# Patient Record
Sex: MALE | Race: WHITE | NOT HISPANIC OR LATINO | Employment: OTHER | URBAN - METROPOLITAN AREA
[De-identification: names, ages, dates, MRNs, and addresses within clinical notes are randomized per-mention and may not be internally consistent; named-entity substitution may affect disease eponyms.]

---

## 2021-06-23 ENCOUNTER — HOSPITAL ENCOUNTER (OUTPATIENT)
Dept: CARDIOLOGY | Facility: HOSPITAL | Age: 71
Discharge: HOME | End: 2021-06-23
Admitting: FAMILY MEDICINE

## 2021-06-23 ENCOUNTER — TRANSCRIBE ORDERS (OUTPATIENT)
Dept: ADMINISTRATIVE | Facility: HOSPITAL | Age: 71
End: 2021-06-23

## 2021-06-23 VITALS — HEIGHT: 73 IN | WEIGHT: 247 LBS | BODY MASS INDEX: 32.74 KG/M2

## 2021-06-23 DIAGNOSIS — I35.0 AORTIC VALVE STENOSIS, MODERATE: Primary | ICD-10-CM

## 2021-06-23 DIAGNOSIS — I35.0 AORTIC VALVE STENOSIS, MODERATE: ICD-10-CM

## 2021-06-23 PROCEDURE — 93306 TTE W/DOPPLER COMPLETE: CPT

## 2021-06-25 LAB
BH CV ECHO MEAS - AO MAX PG (FULL): 70.7 MMHG
BH CV ECHO MEAS - AO MAX PG: 78.1 MMHG
BH CV ECHO MEAS - AO MEAN PG (FULL): 42 MMHG
BH CV ECHO MEAS - AO MEAN PG: 46 MMHG
BH CV ECHO MEAS - AO ROOT AREA (BSA CORRECTED): 1.5
BH CV ECHO MEAS - AO ROOT AREA: 9.6 CM^2
BH CV ECHO MEAS - AO ROOT DIAM: 3.5 CM
BH CV ECHO MEAS - AO V2 MAX: 442 CM/SEC
BH CV ECHO MEAS - AO V2 MEAN: 313 CM/SEC
BH CV ECHO MEAS - AO V2 VTI: 85.8 CM
BH CV ECHO MEAS - ASC AORTA: 3.2 CM
BH CV ECHO MEAS - AVA(I,A): 1.1 CM^2
BH CV ECHO MEAS - AVA(I,D): 1.1 CM^2
BH CV ECHO MEAS - AVA(V,A): 0.97 CM^2
BH CV ECHO MEAS - AVA(V,D): 0.97 CM^2
BH CV ECHO MEAS - BSA(HAYCOCK): 2.4 M^2
BH CV ECHO MEAS - BSA: 2.4 M^2
BH CV ECHO MEAS - BZI_BMI: 32.6 KILOGRAMS/M^2
BH CV ECHO MEAS - BZI_METRIC_HEIGHT: 185.4 CM
BH CV ECHO MEAS - BZI_METRIC_WEIGHT: 112 KG
BH CV ECHO MEAS - EDV(CUBED): 119.8 ML
BH CV ECHO MEAS - EDV(MOD-SP2): 113 ML
BH CV ECHO MEAS - EDV(MOD-SP4): 133 ML
BH CV ECHO MEAS - EDV(TEICH): 114.4 ML
BH CV ECHO MEAS - EF(CUBED): 74.4 %
BH CV ECHO MEAS - EF(MOD-BP): 62 %
BH CV ECHO MEAS - EF(MOD-SP2): 62.8 %
BH CV ECHO MEAS - EF(MOD-SP4): 60.2 %
BH CV ECHO MEAS - EF(TEICH): 66.1 %
BH CV ECHO MEAS - ESV(CUBED): 30.7 ML
BH CV ECHO MEAS - ESV(MOD-SP2): 42 ML
BH CV ECHO MEAS - ESV(MOD-SP4): 53 ML
BH CV ECHO MEAS - ESV(TEICH): 38.8 ML
BH CV ECHO MEAS - FS: 36.5 %
BH CV ECHO MEAS - IVS/LVPW: 0.89
BH CV ECHO MEAS - IVSD: 1.1 CM
BH CV ECHO MEAS - LA DIMENSION: 3.6 CM
BH CV ECHO MEAS - LA/AO: 1
BH CV ECHO MEAS - LAD MAJOR: 6 CM
BH CV ECHO MEAS - LAT PEAK E' VEL: 4 CM/SEC
BH CV ECHO MEAS - LATERAL E/E' RATIO: 19.6
BH CV ECHO MEAS - LV DIASTOLIC VOL/BSA (35-75): 56.5 ML/M^2
BH CV ECHO MEAS - LV IVRT: 0.1 SEC
BH CV ECHO MEAS - LV MASS(C)D: 206.3 GRAMS
BH CV ECHO MEAS - LV MASS(C)DI: 87.6 GRAMS/M^2
BH CV ECHO MEAS - LV MAX PG: 7.4 MMHG
BH CV ECHO MEAS - LV MEAN PG: 4 MMHG
BH CV ECHO MEAS - LV SYSTOLIC VOL/BSA (12-30): 22.5 ML/M^2
BH CV ECHO MEAS - LV V1 MAX: 136.3 CM/SEC
BH CV ECHO MEAS - LV V1 MEAN: 93.3 CM/SEC
BH CV ECHO MEAS - LV V1 VTI: 29.2 CM
BH CV ECHO MEAS - LVIDD: 4.9 CM
BH CV ECHO MEAS - LVIDS: 3.1 CM
BH CV ECHO MEAS - LVLD AP2: 9.7 CM
BH CV ECHO MEAS - LVLD AP4: 9.4 CM
BH CV ECHO MEAS - LVLS AP2: 8.3 CM
BH CV ECHO MEAS - LVLS AP4: 8.4 CM
BH CV ECHO MEAS - LVOT AREA (M): 3.1 CM^2
BH CV ECHO MEAS - LVOT AREA: 3.1 CM^2
BH CV ECHO MEAS - LVOT DIAM: 2 CM
BH CV ECHO MEAS - LVPWD: 1.2 CM
BH CV ECHO MEAS - MED PEAK E' VEL: 4.5 CM/SEC
BH CV ECHO MEAS - MEDIAL E/E' RATIO: 17.3
BH CV ECHO MEAS - MV A MAX VEL: 90.8 CM/SEC
BH CV ECHO MEAS - MV DEC TIME: 0.31 SEC
BH CV ECHO MEAS - MV E MAX VEL: 77.5 CM/SEC
BH CV ECHO MEAS - MV E/A: 0.85
BH CV ECHO MEAS - PA ACC SLOPE: 966 CM/SEC^2
BH CV ECHO MEAS - PA ACC TIME: 0.1 SEC
BH CV ECHO MEAS - PA PR(ACCEL): 34.5 MMHG
BH CV ECHO MEAS - PI END-D VEL: 106.1 CM/SEC
BH CV ECHO MEAS - RAP SYSTOLE: 3 MMHG
BH CV ECHO MEAS - RVSP: 28 MMHG
BH CV ECHO MEAS - SI(AO): 350.7 ML/M^2
BH CV ECHO MEAS - SI(CUBED): 37.9 ML/M^2
BH CV ECHO MEAS - SI(LVOT): 39 ML/M^2
BH CV ECHO MEAS - SI(MOD-SP2): 30.2 ML/M^2
BH CV ECHO MEAS - SI(MOD-SP4): 34 ML/M^2
BH CV ECHO MEAS - SI(TEICH): 32.1 ML/M^2
BH CV ECHO MEAS - SV(AO): 825.5 ML
BH CV ECHO MEAS - SV(CUBED): 89.2 ML
BH CV ECHO MEAS - SV(LVOT): 91.7 ML
BH CV ECHO MEAS - SV(MOD-SP2): 71 ML
BH CV ECHO MEAS - SV(MOD-SP4): 80 ML
BH CV ECHO MEAS - SV(TEICH): 75.6 ML
BH CV ECHO MEAS - TAPSE (>1.6): 2.2 CM
BH CV ECHO MEAS - TR MAX PG: 25 MMHG
BH CV ECHO MEAS - TR MAX VEL: 250.5 CM/SEC
BH CV ECHO MEASUREMENTS AVERAGE E/E' RATIO: 18.24
BH CV VAS BP LEFT ARM: NORMAL MMHG
BH CV XLRA - RV BASE: 3.8 CM
BH CV XLRA - RV LENGTH: 7 CM
BH CV XLRA - RV MID: 3.4 CM
BH CV XLRA - TDI S': 16.8 CM/SEC
IVRT: 98 MSEC
LEFT ATRIUM VOLUME INDEX: 22.9 ML/M^2
LEFT ATRIUM VOLUME: 54 ML
LV EF 2D ECHO EST: 60 %
MAXIMAL PREDICTED HEART RATE: 152 BPM
STRESS TARGET HR: 129 BPM

## 2021-07-21 ENCOUNTER — OFFICE VISIT (OUTPATIENT)
Dept: CARDIOLOGY | Facility: CLINIC | Age: 71
End: 2021-07-21

## 2021-07-21 VITALS
WEIGHT: 250.8 LBS | DIASTOLIC BLOOD PRESSURE: 64 MMHG | OXYGEN SATURATION: 98 % | BODY MASS INDEX: 33.24 KG/M2 | SYSTOLIC BLOOD PRESSURE: 130 MMHG | HEART RATE: 56 BPM | HEIGHT: 73 IN

## 2021-07-21 DIAGNOSIS — I35.0 AORTIC STENOSIS, MODERATE: Primary | ICD-10-CM

## 2021-07-21 DIAGNOSIS — I95.1 ORTHOSTATIC HYPOTENSION: ICD-10-CM

## 2021-07-21 DIAGNOSIS — R42 DIZZINESS: ICD-10-CM

## 2021-07-21 DIAGNOSIS — R06.09 DOE (DYSPNEA ON EXERTION): ICD-10-CM

## 2021-07-21 PROCEDURE — 93000 ELECTROCARDIOGRAM COMPLETE: CPT | Performed by: NURSE PRACTITIONER

## 2021-07-21 PROCEDURE — 99204 OFFICE O/P NEW MOD 45 MIN: CPT | Performed by: INTERNAL MEDICINE

## 2021-07-21 RX ORDER — SEMAGLUTIDE 1.34 MG/ML
0.25 INJECTION, SOLUTION SUBCUTANEOUS WEEKLY
COMMUNITY
Start: 2021-05-06

## 2021-07-21 RX ORDER — PRAZOSIN HYDROCHLORIDE 5 MG/1
5 CAPSULE ORAL DAILY
COMMUNITY
Start: 2021-06-22 | End: 2021-10-19 | Stop reason: HOSPADM

## 2021-07-21 RX ORDER — GUAIFENESIN 200 MG/1
400 TABLET ORAL EVERY 4 HOURS PRN
COMMUNITY

## 2021-07-21 RX ORDER — ASPIRIN 81 MG/1
81 TABLET ORAL DAILY
COMMUNITY

## 2021-07-21 RX ORDER — HYDROCHLOROTHIAZIDE 25 MG/1
25 TABLET ORAL DAILY
COMMUNITY

## 2021-07-21 RX ORDER — LOSARTAN POTASSIUM 100 MG/1
100 TABLET ORAL DAILY
COMMUNITY
Start: 2021-04-23 | End: 2022-05-26

## 2021-07-21 RX ORDER — FENOFIBRATE 160 MG/1
160 TABLET ORAL DAILY
COMMUNITY
Start: 2021-06-04

## 2021-07-21 RX ORDER — ATORVASTATIN CALCIUM 20 MG/1
20 TABLET, FILM COATED ORAL DAILY
COMMUNITY

## 2021-07-21 NOTE — PROGRESS NOTES
[facility]    Subjective:     Encounter Date:07/21/2021     Patient ID: Mitali Cruz is a 70 y.o. male.  Referring provider: Nirmal Fuentes MD     Reason for consultation:   Chief Complaint   Patient presents with   • Dizziness     Consult      PROBLEM LIST:  1. Aortic stenosis  a. Nulcear stress test 2009, [facility]: positive thallium GXT for ischemia in inferior area. Normal EF  b. C 8/21/2009: normal coronaries, false positive cardiolite GXT.   c. Echo 6/23/21, [facility]: EF 60-65%, moderate to severe AS (Ao max PG 78.1 mmHg, Ao mean PG 46 mmHg, Ao V2 VTI 85.8 cm, JAMES 1.1cm2), RVSP < 35 mmHg.    2. Hypertension  3. Type II diabetes  4. Prostate cancer  5. Hyperlipidemia  a. FLP 3/3/21: , Trig 681, LDL not calculated, HDL 21.9.   b. CMP 3/3/21: AST 31, ALT 31, creatinine 1.4  6. ALONZO  7. Adrenal tumor  8. BPH    HPI:      Mitali Cruz is a 70 y.o. male who is seen in consultation today at the request of Nirmal Fuentes MD for aortic stenosis. He was diagnosed with mild AS about 3 years ago. Repeat echo ~15 months revealed moderate AS. He was evaluated by cardiologist at [facility] after this echo prior to proceeding with prostate surgery. He was started on metoprolol, statin and ASA and was cleared for surgery. He was lost to follow up during the pandemic. Most recent echocardiogram was performed in 6/2021 by PCP and revealed moderate to severe AS. He is a former patient of Dr. Singh for history of chest pain. He decided to return to her for further evaluation of chest pain. His biggest complaint is lightheadedness. Lightheadedness began about 3-4 months ago. Occurs after working in the yard and upon standing up. He was seen by PCP for this which revealed a 30 mmHg drop in systolic pressure from laying to sitting. His meds were not adjusted. Instead, he was referred to cardiology. This has not been as bad over the last couple of weeks. He also reports worsening fatigue  over the last year. This has limited his ability to exercise. He was referred to sleep study but has not completed this lately. He also reports GLEASON but this is unchanged over the past year. He attributes his GLEASON to being out of shape. He denies PND, orthopnea, near syncope, syncope or chest pain. He is on HCTZ for edema and feels like this has done a good job controlling this. His blood pressures have been 150's systolic and 60's diastolic.       During exam patient became very lightheaded. Blood pressure was 140 systolic and HRs in the 80's. He was agreeable to taking a glucose tab and waiting in exam room until symptoms subside.     Medications and Allergies:    Allergies   Allergen Reactions   • Lisinopril Cough   • Nifedipine Swelling   • Singulair [Montelukast] Hives         Current Outpatient Medications:   •  aspirin 81 MG EC tablet, Take 81 mg by mouth Daily., Disp: , Rfl:   •  Atorvastatin Calcium (LIPITOR PO), 20 mg Daily., Disp: , Rfl:   •  fenofibrate 160 MG tablet, Daily., Disp: , Rfl:   •  guaiFENesin 200 MG tablet, Take 400 mg by mouth Every 4 (Four) Hours As Needed for Cough., Disp: , Rfl:   •  hydroCHLOROthiazide (HYDRODIURIL) 25 MG tablet, Take 25 mg by mouth Daily., Disp: , Rfl:   •  Insulin Aspart (NOVOLOG FLEXPEN SC), 30 Units., Disp: , Rfl:   •  Insulin Degludec (TRESIBA FLEXTOUCH SC), 60 Units., Disp: , Rfl:   •  losartan (COZAAR) 100 MG tablet, Daily., Disp: , Rfl:   •  metoprolol tartrate (LOPRESSOR) 25 MG tablet, 25 mg Daily., Disp: , Rfl:   •  Ozempic, 0.25 or 0.5 MG/DOSE, 2 MG/1.5ML solution pen-injector, 1 (One) Time Per Week., Disp: , Rfl:   •  prazosin (MINIPRESS) 5 MG capsule, 5 mg Daily., Disp: , Rfl:     Social History:  Social History     Tobacco Use   • Smoking status: Never Smoker   • Smokeless tobacco: Never Used   Substance Use Topics   • Alcohol use: Not Currently        Family History:  No significant family history.      Review of Systems: Pertinent positives in HPI    The  following portions of the patient's history were reviewed and updated as appropriate: allergies, current medications and problem list.       Objective:     Vitals:    07/21/21 0950   BP: 130/64   Pulse: 56   SpO2: 98%     GENERAL: This is a well-developed, well-nourished, male who is in no acute distress.   SKIN: Pink and warm without rash or abnormality noted.   HEENT: Head is normocephalic and atraumatic. Pupils are equal and reactive to light bilaterally. Mucous membranes are pink and moist.   NECK: Supple without lymphadenopathy or thyromegaly. There is no jugular venous distention at 30°.  LUNGS: Clear to auscultation bilaterally with occasional expiratory wheezes, no rhonchi, or rales noted.   CARDIOVASCULAR: The heart has a regular rate with a normal S1 and S2. There is 2-3/6 XIANG at RUSB with radiation to bilateral carotids, no gallop, rub, or click appreciated. The PMI is nondisplaced. Carotid upstrokes are 2+ and symmetrical without bruits.   ABDOMEN: Soft and nondistended with positive bowel sounds x4. The patient denies tenderness of palpitation.   MUSCULOSKELETAL: There are no obvious bony abnormalities. Normal range of tenderness to palpation.   NEUROLOGICAL: Nonfocal. Alert and oriented x3.   PERIPHERAL VASCULAR: Femoral pulses are 2+ and symmetrical without bruits. Posterior tibial and dorsalis pedis pulses are 2+ and symmetrical. There is 1+ peripheral edema.   PSYCH: Normal mood and affect.       ECG 12 Lead    Date/Time: 7/21/2021 10:28 AM  Performed by: Ernestine Barton APRN  Authorized by: Ernestine Barton APRN   Comparison: not compared with previous ECG   Previous ECG: no previous ECG available  Rhythm: sinus rhythm  BPM: 56  Other findings: T wave abnormality              ASSESSMENT       ICD-10-CM ICD-9-CM   1. Aortic stenosis, moderate  I35.0 424.1   2. LGEASON (dyspnea on exertion)  R06.00 786.09   3. Orthostatic hypotension  I95.1 458.0          PLAN     1. Discussed with patient  that his orthostatic hypotension is likely in part attributed to his severe aortic stenosis.   2. Recommend taking HCTZ PRN for edema in setting of severe AS and orthostatic hypotension.   3. Discussed with patient surgical AVR vs. TAVR.   4. Sent epic message to KATARINA Freeman to set up CTS consult, carotid ultrasound, CT chest/abd/pelvis, CHRISTOPHER, LHC plus/minus as part of TAVR workup.  5. Encouraged sleep study  6. Follow-up in Return for after TAVR., sooner as needed.      Scribed for Kelli Singh MD by KATARINA Ricci. 7/21/2021  10:37 EDT     I Kelli Singh MD personally performed the services described in this documentation as scribed by the above individual in my presence, and it is both accurate and complete.    Kelli Singh MD, FACC

## 2021-07-21 NOTE — PROGRESS NOTES
TAVR KATARINA    Patient seen today in Cardiology clinic per Dr. Singh.  Severe aortic stenosis and recommended for TAVR evaluation.  Please see orders below.      Mary BRAY

## 2021-08-02 ENCOUNTER — OFFICE VISIT (OUTPATIENT)
Dept: CARDIAC SURGERY | Facility: CLINIC | Age: 71
End: 2021-08-02

## 2021-08-02 VITALS
DIASTOLIC BLOOD PRESSURE: 82 MMHG | WEIGHT: 246.8 LBS | OXYGEN SATURATION: 98 % | SYSTOLIC BLOOD PRESSURE: 158 MMHG | BODY MASS INDEX: 32.71 KG/M2 | HEART RATE: 62 BPM | TEMPERATURE: 97.1 F | HEIGHT: 73 IN

## 2021-08-02 DIAGNOSIS — I35.9 AORTIC VALVE DISORDER: Primary | ICD-10-CM

## 2021-08-02 PROCEDURE — 99205 OFFICE O/P NEW HI 60 MIN: CPT | Performed by: THORACIC SURGERY (CARDIOTHORACIC VASCULAR SURGERY)

## 2021-08-02 NOTE — PROGRESS NOTES
08/02/2021  Patient Information  Mitali Cruz                                                                                          [address] [place] [address]   1950  'PCP/Referring Physician'  Nirmal Fuentes MD  [phone]  Mary Read APRN  [phone]  Chief Complaint   Patient presents with   • Consult     N/p per Mary BRAY for aotic stenosis TAVR eval. Pt states that he is light headed very often, vision has been effected from time to time, SOB with exertion, some slight leg swelling in the ankles.  Denies any chest pain       History of Present Illness:  The patient is a 70-year-old male who was referred to me to evaluate for transcatheter aortic valve replacement.  He has severe symptoms with shortness of breath and fatigue with minimal exertion.  He has had a precordial echocardiogram demonstrating a peak gradient across his valve of 78 mmHg with a velocity of 442.  He has not had a cardiac catheterization.  He has not had a transesophageal echocardiogram.  And he has not had a CT angiogram of his thoracic aorta to his femoral arteries.      Patient Active Problem List   Diagnosis   • Aortic stenosis, moderate   • GLEASON (dyspnea on exertion)   • Orthostatic hypotension   • Aortic valve disorder     Past Medical History:   Diagnosis Date   • Cancer (CMS/HCC)     prostate   • Carpal tunnel syndrome of left wrist    • Diabetes mellitus (CMS/HCC)    • History of echocardiogram    • History of stress test    • Hyperlipidemia    • Hypertension    • Kidney disorder    • Kidney stones    • Mitral valve disorder      Past Surgical History:   Procedure Laterality Date   • APPENDECTOMY     • CARPAL TUNNEL RELEASE     • PROSTATECTOMY     • SINUS SURGERY     • TONSILLECTOMY     • TRIGGER FINGER RELEASE         Current Outpatient Medications:   •  aspirin 81 MG EC tablet, Take 81 mg by mouth Daily., Disp: , Rfl:   •  Atorvastatin Calcium (LIPITOR PO), 20 mg Daily., Disp: , Rfl:    •  fenofibrate 160 MG tablet, Daily., Disp: , Rfl:   •  guaiFENesin 200 MG tablet, Take 400 mg by mouth Every 4 (Four) Hours As Needed for Cough., Disp: , Rfl:   •  hydroCHLOROthiazide (HYDRODIURIL) 25 MG tablet, Take 25 mg by mouth Daily., Disp: , Rfl:   •  Insulin Aspart (NOVOLOG FLEXPEN SC), 30 Units., Disp: , Rfl:   •  Insulin Degludec (TRESIBA FLEXTOUCH SC), 60 Units., Disp: , Rfl:   •  losartan (COZAAR) 100 MG tablet, Daily., Disp: , Rfl:   •  metoprolol tartrate (LOPRESSOR) 25 MG tablet, 25 mg Daily., Disp: , Rfl:   •  Ozempic, 0.25 or 0.5 MG/DOSE, 2 MG/1.5ML solution pen-injector, 1 (One) Time Per Week., Disp: , Rfl:   •  prazosin (MINIPRESS) 5 MG capsule, 5 mg Daily., Disp: , Rfl:   Allergies   Allergen Reactions   • Lisinopril Cough   • Metformin GI Intolerance   • Nifedipine Swelling   • Singulair [Montelukast] Hives     Social History     Socioeconomic History   • Marital status: Single     Spouse name: Not on file   • Number of children: Not on file   • Years of education: Not on file   • Highest education level: Not on file   Tobacco Use   • Smoking status: Never Smoker   • Smokeless tobacco: Never Used   Vaping Use   • Vaping Use: Never used   Substance and Sexual Activity   • Alcohol use: Not Currently   • Drug use: Not Currently   • Sexual activity: Defer     History reviewed. No pertinent family history.  Review of Systems   Constitutional: Positive for malaise/fatigue. Negative for chills, fever, night sweats and weight loss.   HENT: Positive for congestion (sinus and allergies). Negative for hearing loss, nosebleeds and odynophagia.    Eyes: Positive for visual disturbance.   Cardiovascular: Positive for chest pain (sporatic pain in the upper right pectoral muscle ), dyspnea on exertion and leg swelling (slight swelling at the ankles). Negative for claudication, orthopnea, palpitations and syncope.   Respiratory: Positive for cough, shortness of breath, sputum production and wheezing. Negative  "for hemoptysis.    Endocrine: Negative for cold intolerance, heat intolerance, polydipsia, polyphagia and polyuria.   Hematologic/Lymphatic: Does not bruise/bleed easily.   Skin: Positive for poor wound healing. Negative for itching and rash.   Musculoskeletal: Positive for arthritis, back pain, joint pain (right shoulder) and muscle cramps. Negative for joint swelling and myalgias.   Gastrointestinal: Positive for diarrhea. Negative for abdominal pain, constipation, hematemesis, melena, nausea and vomiting.   Genitourinary: Positive for frequency. Negative for dysuria, hematuria and urgency.   Neurological: Positive for dizziness, light-headedness, loss of balance and numbness (left hand numb b/c of carpal tunnel).   Psychiatric/Behavioral: Negative for depression and suicidal ideas. The patient is not nervous/anxious.    Allergic/Immunologic: Positive for environmental allergies. Negative for HIV exposure.     Vitals:    08/02/21 0929   BP: 158/82   Pulse: 62   Temp: 97.1 °F (36.2 °C)   SpO2: 98%   Weight: 112 kg (246 lb 12.8 oz)   Height: 185.4 cm (73\")      Physical Exam   CONSTITUTIONAL: Alert and conversant, Well dressed, Well nourished, No acute distress  EYES: Sclera clean, Anicteric, Pupils equal  ENT: No nasal deviation, Trachea midline  NECK: No neck masses, Supple  LUNGS: No wheezing, Cough, non-congested  HEART: No rubs, there is a murmur to the right of the sternal border  GASTROINTESTINAL: Soft, non-distended, No masses, Non tender  to palpation, normal bowel sounds  NEURO: No motor deficits, No sensory deficits, Cranial Nerves 2 through 12 grossly intact  PSYCHIATRIC: Oriented to person, place and time, No memory deficits, Mood appropriate  VASCULAR: No carotid bruits, Femoral pulses palpable and symmetric  MUSKULOSKELETAL: No extremity trauma or extremity asymmetry    The ROS, past medical history, surgical history, family history, social history and vitals were reviewed by myself and corrected as " needed.      Labs/Imaging:  I reviewed the precordial echo results.    Assessment/Plan:   The patient is a 70-year-old male who is being referred for severe symptomatic aortic valvular stenosis.  He has a peak gradient across his aortic valve of 78 mmHg.  He has not had a catheterization, transesophageal echo, or CT angiogram. I described the transcatheter procedure and explained this to him.  I explained the expected outcome of a 1 day hospital stay, but I did indicate every individual results can vary.  I suspect he will be a candidate as he has strongly palpable pulses.  I have also indicated that there is always a risk of possible pacemaker placement in some patients.  I have made arrangements for him to meet our nurse coordinator when he leaves the office today.    Patient Active Problem List   Diagnosis   • Aortic stenosis, moderate   • GLEASON (dyspnea on exertion)   • Orthostatic hypotension   • Aortic valve disorder       CC: MD Mary Grissom APRN Regina Fugate editing for Joseph Major MD    I, Joseph Major MD, have read and agree with the editing done by Dorys Louis, .

## 2021-08-03 PROBLEM — R42 DIZZINESS: Status: ACTIVE | Noted: 2021-08-03

## 2021-08-06 ENCOUNTER — TELEPHONE (OUTPATIENT)
Dept: CARDIOLOGY | Facility: HOSPITAL | Age: 71
End: 2021-08-06

## 2021-08-06 NOTE — TELEPHONE ENCOUNTER
TAVR KATARINA    Telephone call with Mr. Cruz.  Scheduled for pre-op TAVR cath/tereso on 8/13/21.  Arrive 8 am to CV.  Bring meds and .  NPO after MN.  Pre-procedure COVID test 8/10 @ [place] 10 am.      TAVR CTA 8/16/21.  Arrival jayme 8 am.  NPO after 7:30 am.  No caffeine.  No covid test or  needed.      Advised to drink extra water the day before cath and the two days in between these two testing days due to IV dye administration.      Mr. Cruz read back to me the above information.    Mary BRAY

## 2021-08-09 ENCOUNTER — PREP FOR SURGERY (OUTPATIENT)
Dept: OTHER | Facility: HOSPITAL | Age: 71
End: 2021-08-09

## 2021-08-09 DIAGNOSIS — R79.9 ABNORMAL FINDING OF BLOOD CHEMISTRY, UNSPECIFIED: ICD-10-CM

## 2021-08-09 DIAGNOSIS — I35.0 AORTIC STENOSIS, MODERATE: Primary | ICD-10-CM

## 2021-08-09 RX ORDER — SODIUM CHLORIDE 0.9 % (FLUSH) 0.9 %
3-10 SYRINGE (ML) INJECTION AS NEEDED
Status: CANCELLED | OUTPATIENT
Start: 2021-08-09

## 2021-08-09 RX ORDER — SODIUM CHLORIDE 0.9 % (FLUSH) 0.9 %
3 SYRINGE (ML) INJECTION EVERY 12 HOURS SCHEDULED
Status: CANCELLED | OUTPATIENT
Start: 2021-08-09

## 2021-08-09 RX ORDER — SODIUM CHLORIDE 9 MG/ML
330 INJECTION, SOLUTION INTRAVENOUS CONTINUOUS
Status: CANCELLED | OUTPATIENT
Start: 2021-08-09 | End: 2021-08-09

## 2021-08-10 ENCOUNTER — APPOINTMENT (OUTPATIENT)
Dept: PREADMISSION TESTING | Facility: HOSPITAL | Age: 71
End: 2021-08-10

## 2021-08-10 LAB — SARS-COV-2 RNA PNL SPEC NAA+PROBE: NOT DETECTED

## 2021-08-10 PROCEDURE — U0004 COV-19 TEST NON-CDC HGH THRU: HCPCS

## 2021-08-10 PROCEDURE — C9803 HOPD COVID-19 SPEC COLLECT: HCPCS

## 2021-08-10 PROCEDURE — U0005 INFEC AGEN DETEC AMPLI PROBE: HCPCS

## 2021-08-13 ENCOUNTER — APPOINTMENT (OUTPATIENT)
Dept: CARDIOLOGY | Facility: HOSPITAL | Age: 71
End: 2021-08-13

## 2021-08-13 ENCOUNTER — APPOINTMENT (OUTPATIENT)
Dept: CT IMAGING | Facility: HOSPITAL | Age: 71
End: 2021-08-13

## 2021-08-13 ENCOUNTER — HOSPITAL ENCOUNTER (OUTPATIENT)
Dept: CARDIOLOGY | Facility: HOSPITAL | Age: 71
Discharge: HOME | End: 2021-08-13

## 2021-08-13 ENCOUNTER — HOSPITAL ENCOUNTER (OUTPATIENT)
Facility: HOSPITAL | Age: 71
Discharge: HOME | End: 2021-08-13
Attending: INTERNAL MEDICINE | Admitting: INTERNAL MEDICINE

## 2021-08-13 VITALS — BODY MASS INDEX: 32.6 KG/M2 | HEIGHT: 73 IN | WEIGHT: 246 LBS

## 2021-08-13 VITALS
RESPIRATION RATE: 18 BRPM | OXYGEN SATURATION: 96 % | SYSTOLIC BLOOD PRESSURE: 123 MMHG | HEART RATE: 46 BPM | DIASTOLIC BLOOD PRESSURE: 74 MMHG | WEIGHT: 246 LBS | BODY MASS INDEX: 32.6 KG/M2 | TEMPERATURE: 97.2 F | HEIGHT: 73 IN

## 2021-08-13 DIAGNOSIS — I35.0 AORTIC STENOSIS, MODERATE: ICD-10-CM

## 2021-08-13 DIAGNOSIS — R06.09 DOE (DYSPNEA ON EXERTION): ICD-10-CM

## 2021-08-13 DIAGNOSIS — I95.1 ORTHOSTATIC HYPOTENSION: ICD-10-CM

## 2021-08-13 DIAGNOSIS — I35.9 AORTIC VALVE DISORDER: Primary | ICD-10-CM

## 2021-08-13 DIAGNOSIS — R42 DIZZINESS: ICD-10-CM

## 2021-08-13 LAB
ANION GAP SERPL CALCULATED.3IONS-SCNC: 12 MMOL/L (ref 5–15)
BASOPHILS # BLD AUTO: 0.05 10*3/MM3 (ref 0–0.2)
BASOPHILS NFR BLD AUTO: 0.7 % (ref 0–1.5)
BH CV ECHO MEAS - BSA(HAYCOCK): 2.4 M^2
BH CV ECHO MEAS - BSA: 2.3 M^2
BH CV ECHO MEAS - BZI_BMI: 32.5 KILOGRAMS/M^2
BH CV ECHO MEAS - BZI_METRIC_HEIGHT: 185.4 CM
BH CV ECHO MEAS - BZI_METRIC_WEIGHT: 111.6 KG
BH CV XLRA MEAS LEFT BULB EDV: 14.1 CM/SEC
BH CV XLRA MEAS LEFT BULB PSV: 93.3 CM/SEC
BH CV XLRA MEAS LEFT CAROTID BULB EDV: 14.1 CM/SEC
BH CV XLRA MEAS LEFT CAROTID BULB PSV: 93.3 CM/SEC
BH CV XLRA MEAS LEFT DIST CCA EDV: 23.2 CM/SEC
BH CV XLRA MEAS LEFT DIST CCA PSV: 124 CM/SEC
BH CV XLRA MEAS LEFT DIST ICA EDV: 21.1 CM/SEC
BH CV XLRA MEAS LEFT DIST ICA PSV: 73.3 CM/SEC
BH CV XLRA MEAS LEFT ICA/CCA RATIO: 0.79
BH CV XLRA MEAS LEFT MID CCA EDV: 21.3 CM/SEC
BH CV XLRA MEAS LEFT MID CCA PSV: 131 CM/SEC
BH CV XLRA MEAS LEFT MID ICA EDV: 25.9 CM/SEC
BH CV XLRA MEAS LEFT MID ICA PSV: 101 CM/SEC
BH CV XLRA MEAS LEFT PROX CCA EDV: 15.9 CM/SEC
BH CV XLRA MEAS LEFT PROX CCA PSV: 105 CM/SEC
BH CV XLRA MEAS LEFT PROX ECA EDV: 20.3 CM/SEC
BH CV XLRA MEAS LEFT PROX ECA PSV: 167 CM/SEC
BH CV XLRA MEAS LEFT PROX ICA EDV: 19.6 CM/SEC
BH CV XLRA MEAS LEFT PROX ICA PSV: 103 CM/SEC
BH CV XLRA MEAS LEFT PROX SCLA EDV: 0 CM/SEC
BH CV XLRA MEAS LEFT PROX SCLA PSV: 190 CM/SEC
BH CV XLRA MEAS LEFT VERTEBRAL A EDV: 16.9 CM/SEC
BH CV XLRA MEAS LEFT VERTEBRAL A PSV: 49.5 CM/SEC
BH CV XLRA MEAS RIGHT BULB EDV: 14.7 CM/SEC
BH CV XLRA MEAS RIGHT BULB PSV: 59.5 CM/SEC
BH CV XLRA MEAS RIGHT CAROTID BULB EDV: 14.7 CM/SEC
BH CV XLRA MEAS RIGHT CAROTID BULB PSV: 59.5 CM/SEC
BH CV XLRA MEAS RIGHT DIST CCA EDV: 19.6 CM/SEC
BH CV XLRA MEAS RIGHT DIST CCA PSV: 93.2 CM/SEC
BH CV XLRA MEAS RIGHT DIST ICA EDV: 21.7 CM/SEC
BH CV XLRA MEAS RIGHT DIST ICA PSV: 114 CM/SEC
BH CV XLRA MEAS RIGHT ICA/CCA RATIO: 1.05
BH CV XLRA MEAS RIGHT MID CCA EDV: 16.1 CM/SEC
BH CV XLRA MEAS RIGHT MID CCA PSV: 99.5 CM/SEC
BH CV XLRA MEAS RIGHT MID ICA EDV: 24.9 CM/SEC
BH CV XLRA MEAS RIGHT MID ICA PSV: 105 CM/SEC
BH CV XLRA MEAS RIGHT PROX CCA EDV: 16.1 CM/SEC
BH CV XLRA MEAS RIGHT PROX CCA PSV: 104 CM/SEC
BH CV XLRA MEAS RIGHT PROX ECA EDV: 20.4 CM/SEC
BH CV XLRA MEAS RIGHT PROX ECA PSV: 151 CM/SEC
BH CV XLRA MEAS RIGHT PROX ICA EDV: 16.2 CM/SEC
BH CV XLRA MEAS RIGHT PROX ICA PSV: 104 CM/SEC
BH CV XLRA MEAS RIGHT PROX SCLA EDV: 0 CM/SEC
BH CV XLRA MEAS RIGHT PROX SCLA PSV: 124 CM/SEC
BH CV XLRA MEAS RIGHT VERTEBRAL A EDV: 5.8 CM/SEC
BH CV XLRA MEAS RIGHT VERTEBRAL A PSV: 18.9 CM/SEC
BUN SERPL-MCNC: 25 MG/DL (ref 8–23)
BUN/CREAT SERPL: 15.8 (ref 7–25)
CALCIUM SPEC-SCNC: 9.6 MG/DL (ref 8.6–10.5)
CHLORIDE SERPL-SCNC: 105 MMOL/L (ref 98–107)
CHOLEST SERPL-MCNC: 123 MG/DL (ref 0–200)
CO2 SERPL-SCNC: 23 MMOL/L (ref 22–29)
CREAT SERPL-MCNC: 1.58 MG/DL (ref 0.76–1.27)
DEPRECATED RDW RBC AUTO: 45.4 FL (ref 37–54)
EOSINOPHIL # BLD AUTO: 0.21 10*3/MM3 (ref 0–0.4)
EOSINOPHIL NFR BLD AUTO: 2.8 % (ref 0.3–6.2)
ERYTHROCYTE [DISTWIDTH] IN BLOOD BY AUTOMATED COUNT: 15 % (ref 12.3–15.4)
GFR SERPL CREATININE-BSD FRML MDRD: 44 ML/MIN/1.73
GLUCOSE BLDC GLUCOMTR-MCNC: 131 MG/DL (ref 70–130)
GLUCOSE SERPL-MCNC: 150 MG/DL (ref 65–99)
HBA1C MFR BLD: 6.6 % (ref 4.8–5.6)
HCT VFR BLD AUTO: 41.9 % (ref 37.5–51)
HDLC SERPL-MCNC: 23 MG/DL (ref 40–60)
HGB BLD-MCNC: 13.6 G/DL (ref 13–17.7)
IMM GRANULOCYTES # BLD AUTO: 0.04 10*3/MM3 (ref 0–0.05)
IMM GRANULOCYTES NFR BLD AUTO: 0.5 % (ref 0–0.5)
INR PPP: 1.02 (ref 0.85–1.16)
LDLC SERPL CALC-MCNC: 69 MG/DL (ref 0–100)
LDLC/HDLC SERPL: 2.77 {RATIO}
LYMPHOCYTES # BLD AUTO: 1.19 10*3/MM3 (ref 0.7–3.1)
LYMPHOCYTES NFR BLD AUTO: 15.6 % (ref 19.6–45.3)
MAGNESIUM SERPL-MCNC: 2.2 MG/DL (ref 1.6–2.4)
MCH RBC QN AUTO: 26.9 PG (ref 26.6–33)
MCHC RBC AUTO-ENTMCNC: 32.5 G/DL (ref 31.5–35.7)
MCV RBC AUTO: 83 FL (ref 79–97)
MONOCYTES # BLD AUTO: 0.54 10*3/MM3 (ref 0.1–0.9)
MONOCYTES NFR BLD AUTO: 7.1 % (ref 5–12)
NEUTROPHILS NFR BLD AUTO: 5.59 10*3/MM3 (ref 1.7–7)
NEUTROPHILS NFR BLD AUTO: 73.3 % (ref 42.7–76)
NRBC BLD AUTO-RTO: 0 /100 WBC (ref 0–0.2)
PLATELET # BLD AUTO: 208 10*3/MM3 (ref 140–450)
PMV BLD AUTO: 10 FL (ref 6–12)
POTASSIUM SERPL-SCNC: 4.3 MMOL/L (ref 3.5–5.2)
PROTHROMBIN TIME: 13.1 SECONDS (ref 11.4–14.4)
RBC # BLD AUTO: 5.05 10*6/MM3 (ref 4.14–5.8)
RIGHT ARM BP: NORMAL MMHG
SODIUM SERPL-SCNC: 140 MMOL/L (ref 136–145)
TRIGL SERPL-MCNC: 182 MG/DL (ref 0–150)
VLDLC SERPL-MCNC: 31 MG/DL (ref 5–40)
WBC # BLD AUTO: 7.62 10*3/MM3 (ref 3.4–10.8)

## 2021-08-13 PROCEDURE — 93312 ECHO TRANSESOPHAGEAL: CPT | Performed by: INTERNAL MEDICINE

## 2021-08-13 PROCEDURE — 93325 DOPPLER ECHO COLOR FLOW MAPG: CPT

## 2021-08-13 PROCEDURE — 85610 PROTHROMBIN TIME: CPT | Performed by: NURSE PRACTITIONER

## 2021-08-13 PROCEDURE — 93320 DOPPLER ECHO COMPLETE: CPT

## 2021-08-13 PROCEDURE — 80048 BASIC METABOLIC PNL TOTAL CA: CPT | Performed by: NURSE PRACTITIONER

## 2021-08-13 PROCEDURE — 93454 CORONARY ARTERY ANGIO S&I: CPT | Performed by: INTERNAL MEDICINE

## 2021-08-13 PROCEDURE — 80061 LIPID PANEL: CPT | Performed by: NURSE PRACTITIONER

## 2021-08-13 PROCEDURE — 25010000002 FENTANYL CITRATE (PF) 50 MCG/ML SOLUTION: Performed by: INTERNAL MEDICINE

## 2021-08-13 PROCEDURE — 99214 OFFICE O/P EST MOD 30 MIN: CPT | Performed by: NURSE PRACTITIONER

## 2021-08-13 PROCEDURE — C1894 INTRO/SHEATH, NON-LASER: HCPCS | Performed by: INTERNAL MEDICINE

## 2021-08-13 PROCEDURE — 93312 ECHO TRANSESOPHAGEAL: CPT

## 2021-08-13 PROCEDURE — 82962 GLUCOSE BLOOD TEST: CPT

## 2021-08-13 PROCEDURE — 25010000002 HEPARIN (PORCINE) PER 1000 UNITS: Performed by: INTERNAL MEDICINE

## 2021-08-13 PROCEDURE — 99152 MOD SED SAME PHYS/QHP 5/>YRS: CPT | Performed by: INTERNAL MEDICINE

## 2021-08-13 PROCEDURE — 93880 EXTRACRANIAL BILAT STUDY: CPT

## 2021-08-13 PROCEDURE — 25010000002 MIDAZOLAM PER 1 MG: Performed by: INTERNAL MEDICINE

## 2021-08-13 PROCEDURE — 93880 EXTRACRANIAL BILAT STUDY: CPT | Performed by: INTERNAL MEDICINE

## 2021-08-13 PROCEDURE — 83735 ASSAY OF MAGNESIUM: CPT | Performed by: NURSE PRACTITIONER

## 2021-08-13 PROCEDURE — 83036 HEMOGLOBIN GLYCOSYLATED A1C: CPT | Performed by: NURSE PRACTITIONER

## 2021-08-13 PROCEDURE — 93320 DOPPLER ECHO COMPLETE: CPT | Performed by: INTERNAL MEDICINE

## 2021-08-13 PROCEDURE — C1769 GUIDE WIRE: HCPCS | Performed by: INTERNAL MEDICINE

## 2021-08-13 PROCEDURE — 99153 MOD SED SAME PHYS/QHP EA: CPT

## 2021-08-13 PROCEDURE — 93325 DOPPLER ECHO COLOR FLOW MAPG: CPT | Performed by: INTERNAL MEDICINE

## 2021-08-13 PROCEDURE — 99152 MOD SED SAME PHYS/QHP 5/>YRS: CPT

## 2021-08-13 PROCEDURE — 0 IOPAMIDOL PER 1 ML: Performed by: INTERNAL MEDICINE

## 2021-08-13 PROCEDURE — 85025 COMPLETE CBC W/AUTO DIFF WBC: CPT | Performed by: NURSE PRACTITIONER

## 2021-08-13 RX ORDER — SODIUM CHLORIDE 0.9 % (FLUSH) 0.9 %
3-10 SYRINGE (ML) INJECTION AS NEEDED
Status: DISCONTINUED | OUTPATIENT
Start: 2021-08-13 | End: 2021-08-13 | Stop reason: HOSPADM

## 2021-08-13 RX ORDER — FENTANYL CITRATE 50 UG/ML
INJECTION, SOLUTION INTRAMUSCULAR; INTRAVENOUS
Status: COMPLETED | OUTPATIENT
Start: 2021-08-13 | End: 2021-08-13

## 2021-08-13 RX ORDER — MORPHINE SULFATE 2 MG/ML
1 INJECTION, SOLUTION INTRAMUSCULAR; INTRAVENOUS EVERY 4 HOURS PRN
Status: DISCONTINUED | OUTPATIENT
Start: 2021-08-13 | End: 2021-08-13 | Stop reason: HOSPADM

## 2021-08-13 RX ORDER — MIDAZOLAM HYDROCHLORIDE 1 MG/ML
INJECTION INTRAMUSCULAR; INTRAVENOUS
Status: DISCONTINUED
Start: 2021-08-13 | End: 2021-08-13 | Stop reason: HOSPADM

## 2021-08-13 RX ORDER — SODIUM CHLORIDE 9 MG/ML
330 INJECTION, SOLUTION INTRAVENOUS CONTINUOUS
Status: ACTIVE | OUTPATIENT
Start: 2021-08-13 | End: 2021-08-13

## 2021-08-13 RX ORDER — SODIUM CHLORIDE 9 MG/ML
100 INJECTION, SOLUTION INTRAVENOUS CONTINUOUS
Status: ACTIVE | OUTPATIENT
Start: 2021-08-13 | End: 2021-08-13

## 2021-08-13 RX ORDER — LIDOCAINE HYDROCHLORIDE 10 MG/ML
INJECTION, SOLUTION EPIDURAL; INFILTRATION; INTRACAUDAL; PERINEURAL AS NEEDED
Status: DISCONTINUED | OUTPATIENT
Start: 2021-08-13 | End: 2021-08-13 | Stop reason: HOSPADM

## 2021-08-13 RX ORDER — SODIUM CHLORIDE 0.9 % (FLUSH) 0.9 %
3 SYRINGE (ML) INJECTION EVERY 12 HOURS SCHEDULED
Status: DISCONTINUED | OUTPATIENT
Start: 2021-08-13 | End: 2021-08-13 | Stop reason: HOSPADM

## 2021-08-13 RX ORDER — MIDAZOLAM HYDROCHLORIDE 1 MG/ML
INJECTION INTRAMUSCULAR; INTRAVENOUS
Status: COMPLETED | OUTPATIENT
Start: 2021-08-13 | End: 2021-08-13

## 2021-08-13 RX ORDER — ALPRAZOLAM 0.25 MG/1
0.25 TABLET ORAL 3 TIMES DAILY PRN
Status: DISCONTINUED | OUTPATIENT
Start: 2021-08-13 | End: 2021-08-13 | Stop reason: HOSPADM

## 2021-08-13 RX ORDER — FENTANYL CITRATE 50 UG/ML
INJECTION, SOLUTION INTRAMUSCULAR; INTRAVENOUS
Status: DISCONTINUED
Start: 2021-08-13 | End: 2021-08-13 | Stop reason: HOSPADM

## 2021-08-13 RX ORDER — NALOXONE HCL 0.4 MG/ML
0.4 VIAL (ML) INJECTION
Status: DISCONTINUED | OUTPATIENT
Start: 2021-08-13 | End: 2021-08-13 | Stop reason: HOSPADM

## 2021-08-13 RX ORDER — HYDROCODONE BITARTRATE AND ACETAMINOPHEN 7.5; 325 MG/1; MG/1
1 TABLET ORAL EVERY 4 HOURS PRN
Status: DISCONTINUED | OUTPATIENT
Start: 2021-08-13 | End: 2021-08-13 | Stop reason: HOSPADM

## 2021-08-13 RX ORDER — FLUMAZENIL 0.1 MG/ML
INJECTION INTRAVENOUS
Status: DISCONTINUED
Start: 2021-08-13 | End: 2021-08-13 | Stop reason: HOSPADM

## 2021-08-13 RX ORDER — NALOXONE HYDROCHLORIDE 0.4 MG/ML
INJECTION, SOLUTION INTRAMUSCULAR; INTRAVENOUS; SUBCUTANEOUS
Status: DISCONTINUED
Start: 2021-08-13 | End: 2021-08-13 | Stop reason: HOSPADM

## 2021-08-13 RX ORDER — ACETAMINOPHEN 325 MG/1
650 TABLET ORAL EVERY 4 HOURS PRN
Status: DISCONTINUED | OUTPATIENT
Start: 2021-08-13 | End: 2021-08-13 | Stop reason: HOSPADM

## 2021-08-13 RX ADMIN — FENTANYL CITRATE 50 MCG: 50 INJECTION, SOLUTION INTRAMUSCULAR; INTRAVENOUS at 09:18

## 2021-08-13 RX ADMIN — MIDAZOLAM 2 MG: 1 INJECTION INTRAMUSCULAR; INTRAVENOUS at 09:18

## 2021-08-13 RX ADMIN — SODIUM CHLORIDE, PRESERVATIVE FREE 3 ML: 5 INJECTION INTRAVENOUS at 09:00

## 2021-08-13 RX ADMIN — METHOHEXITAL SODIUM 10 MG: 500 INJECTION, POWDER, LYOPHILIZED, FOR SOLUTION INTRAMUSCULAR; INTRAVENOUS; RECTAL at 09:30

## 2021-08-13 RX ADMIN — METHOHEXITAL SODIUM 20 MG: 500 INJECTION, POWDER, LYOPHILIZED, FOR SOLUTION INTRAMUSCULAR; INTRAVENOUS; RECTAL at 09:25

## 2021-08-13 RX ADMIN — SODIUM CHLORIDE 330 ML/HR: 9 INJECTION, SOLUTION INTRAVENOUS at 08:59

## 2021-08-13 RX ADMIN — MIDAZOLAM 2 MG: 1 INJECTION INTRAMUSCULAR; INTRAVENOUS at 09:24

## 2021-08-13 RX ADMIN — FENTANYL CITRATE 50 MCG: 50 INJECTION, SOLUTION INTRAMUSCULAR; INTRAVENOUS at 09:28

## 2021-08-13 RX ADMIN — METHOHEXITAL SODIUM 10 MG: 500 INJECTION, POWDER, LYOPHILIZED, FOR SOLUTION INTRAMUSCULAR; INTRAVENOUS; RECTAL at 09:29

## 2021-08-13 RX ADMIN — FENTANYL CITRATE 50 MCG: 50 INJECTION, SOLUTION INTRAMUSCULAR; INTRAVENOUS at 09:24

## 2021-08-13 NOTE — INTERVAL H&P NOTE
H&P reviewed. The patient was examined and there are no changes to the H&P.    Patient presents today for CHRISTOPHER and LHC plus/minus CBI using RRA as part of TAVR workup. The risks, benefits, and alternatives of the procedure have been reviewed and the patient wishes to proceed.       Electronically signed by KATARINA Sue, 08/13/21, 8:30 AM EDT.    Kelli Singh MD, FACC

## 2021-08-13 NOTE — PROGRESS NOTES
TAVR APRN Evaluation    Mitali Cruz, 1950, [id number]     08/16/21    PCP: Nirmal Fuentes MD  Primary Cardiologist: Kelli Singh MD    TAVR Team:  1.  José Antonio Bal MD  2.  Adriel Marie MD  3.  Kelli Singh MD  4.  Rohit Negrete MD  5.  Reggie Thurston MD  6.  Joseph Major MD    Chief Complaint: Severe aortic stenosis/ DHF    Identification: This is a 71 y.o. year old single gentleman from [address] [place] [address].    History of Present Illness: Mr. Cruz was referred for consideration of TAVR due to ongoing symptoms of AS including feeling light headed with exertion and reduced exercise tolerance as compared to last summer.  His serial echo from 6/23/21 noted JAMES 1.1 cm2, AV mean gradient 46 mm Hg and AV Vmax 4.42 m/sec.  LVEF is 60-65%. He has been seen in Cardiology Clinic and CT Surgery Clinic.  Today he is seen in [facility] following cath/tereso.  He is accompanied by his sister.    Problem List:   1. Aortic stenosis  a. Nulcear stress test 2009, [facility]: positive thallium GXT for ischemia in inferior area. Normal EF  b. [facility] 8/21/2009: normal coronaries, false positive cardiolite GXT.   c. Echo 6/23/21, [facility]: EF 60-65%, moderate to severe AS (Ao max PG 78.1 mmHg, Ao mean PG 46 mmHg, Ao V2 VTI 85.8 cm, JAMES 1.1cm2), RVSP < 35 mmHg  d. [facility]O 8/13/21: LVEF 55%, moderate LVH.  Severe aortic stenosis with aortic annulus 2.4 cm, STJ 2.4 cm, AV mean gradient 38 mm Hg and AV Vmax 4.1 m/sec  2. Hypertension  3. CAD  A.  Non-obstructive CAD: Cardiac cath 8/13/2021  RCA 40% stenosis proximally, LM free from disease.  Circumflex 40-50% stenosis, and LAD 40-50% stenosis just after D2  4. Type II diabetes  A.  Follows @ [facility]  5. Prostate cancer  A.  Prostatectomy 2020  6. Hyperlipidemia  a. FLP 3/3/21: , Trig 681, LDL not calculated, HDL 21.9.   b. CMP 3/3/21: AST 31, ALT 31, creatinine 1.4  7. ALONZO  8. Adrenal tumor  9. BPH  10. Diastolic heart failure  r/t valvular disease      Patient Active Problem List   Diagnosis   • Aortic stenosis, severe   • GLEASON (dyspnea on exertion)   • Orthostatic hypotension   • Aortic valve disorder   • Dizziness       Past Surgical History:  Past Surgical History:   Procedure Laterality Date   • APPENDECTOMY     • CARDIAC CATHETERIZATION N/A 8/13/2021    Procedure: LEFT HEART CATH;  Surgeon: Kelli Singh MD;  Location: [facility] CATH INVASIVE LOC[facility];  Service: Cardiology;  Laterality: N/A;   • CARPAL TUNNEL RELEASE     • PROSTATECTOMY     • SINUS SURGERY     • TONSILLECTOMY     • TRIGGER FINGER RELEASE         Allergies:  Lisinopril, Metformin, Nifedipine, and Singulair [montelukast]    Social History:  Social History     Socioeconomic History   • Marital status: Single     Spouse name: NA   • Number of children: 0   • Years of education: HS + some college   • Highest education level: Not on file   Tobacco Use   • Smoking status: Never Smoker   • Smokeless tobacco: Never Used   Vaping Use   • Vaping Use: Never used   Substance and Sexual Activity   • Alcohol use: Not Currently   • Drug use: Not Currently   • Sexual activity: Defer       Home Medications:  Medication Sig Start Date End Date Taking? Authorizing Provider   aspirin 81 MG EC tablet Take 81 mg by mouth Daily.   Yes Amy Martinez MD   Atorvastatin Calcium (LIPITOR PO) 20 mg Daily.   Yes Amy Martinez MD   fenofibrate 160 MG tablet Daily. 6/4/21  Yes Amy Martinez MD   guaiFENesin 200 MG tablet Take 400 mg by mouth Every 4 (Four) Hours As Needed for Cough.   Yes Amy Martinez MD   hydroCHLOROthiazide (HYDRODIURIL) 25 MG tablet Take 25 mg by mouth Daily.   Yes Amy Martinez MD   Insulin Aspart (NOVOLOG FLEXPEN SC) 30 Units.   Yes Amy Martinez MD   Insulin Degludec (TRESIBA FLEXTOUCH SC) 60 Units.   Yes Amy Martinez MD   losartan (COZAAR) 100 MG tablet Daily. 4/23/21  Yes Amy Martinez MD   metoprolol  tartrate (LOPRESSOR) 25 MG tablet 25 mg Daily. 6/22/21  Yes Provider, Amy, MD   Ozempic, 0.25 or 0.5 MG/DOSE, 2 MG/1.5ML solution pen-injector 1 (One) Time Per Week. 5/6/21  Yes Provider, MD Amy   prazosin (MINIPRESS) 5 MG capsule 5 mg Daily. 6/22/21  Yes Provider, MD Amy         Review of Systems:  Review of Systems   Constitutional: Positive for malaise/fatigue.   HENT: Negative.    Eyes: Negative.    Cardiovascular: Positive for chest pain, dyspnea on exertion and leg swelling. Negative for irregular heartbeat, near-syncope, orthopnea, palpitations, paroxysmal nocturnal dyspnea and syncope.   Respiratory: Negative for cough, sleep disturbances due to breathing and wheezing.    Hematologic/Lymphatic: Does not bruise/bleed easily.   Skin: Negative.    Musculoskeletal: Positive for arthritis.   Gastrointestinal: Negative.    Genitourinary: Negative.    Neurological: Positive for dizziness and light-headedness.   Psychiatric/Behavioral: Negative.    Allergic/Immunologic: Negative.         Physical Exam:  Vitals reviewed.   Constitutional:       Appearance: Not in distress.   Eyes:      Conjunctiva/sclera: Conjunctivae normal.      Pupils: Pupils are equal, round, and reactive to light.   HENT:    Mouth/Throat:      Dentition: Normal.      Pharynx: Oropharynx is clear.   Neck:      Thyroid: No thyromegaly.      Vascular: JVD normal.      Lymphadenopathy: No cervical adenopathy.   Pulmonary:      Effort: Pulmonary effort is normal.      Breath sounds: Normal breath sounds. No wheezing. No rhonchi. No rales.   Cardiovascular:      Bradycardia present. Regular rhythm.      Murmurs: There is a grade 3/6 harsh midsystolic murmur at the URSB, radiating to the neck.      No click. No rub.   Pulses:     Intact distal pulses.   Edema:     Peripheral edema present.     Ankle: bilateral trace edema of the ankle.     Feet: bilateral trace edema of the feet.  Abdominal:      General: Bowel sounds are normal.       Palpations: Abdomen is soft.   Musculoskeletal: Normal range of motion.         General: No deformity.      Extremities: No clubbing present.     Cervical back: Normal range of motion. Skin:     General: Skin is warm and dry.   Neurological:      Mental Status: Alert and oriented to person, place and time.         Vitals:    08/13/21 1245 08/13/21 1300 08/13/21 1315 08/13/21 1330   BP: 120/84 123/68 131/64 123/74   BP Location:       Patient Position:       Pulse: (!) 44 (!) 43 50 (!) 46   Resp:       Temp:       TempSrc:       SpO2: 95% 95% 96% 96%   Weight:       Height:           Diagnostic Data:  Transthoracic echo: 6/25/21    · Left ventricular wall thickness is consistent with hypertrophy.  · Estimated left ventricular EF = 60% Left ventricular ejection fraction appears to be 61 - 65%. Left ventricular systolic function is normal.  · Moderate to severe aortic valve stenosis is present.  · Estimated right ventricular systolic pressure from tricuspid regurgitation is normal (<35 mmHg).  Aortic Valve Measurements    Stenosis   LVOT diam 2 cm      LV V1 max 136.3 cm/sec      LV V1 max PG 7.4 mmHg      LV V1 mean PG 4 mmHg      LV V1 VTI 29.2 cm      Ao pk alexander 442 cm/sec       Stenosis   Ao max PG 78.1 mmHg      Ao mean PG 46 mmHg      Ao V2 VTI 85.8 cm      JAMES(I,D) 1.1 cm^2                 Transesophageal echo: 8/13/21       · Estimated left ventricular EF = 55% Left ventricular systolic function is normal.  · Left ventricular wall thickness is consistent with moderate concentric hypertrophy.  · Severe aortic valve stenosis is present. Aortic annulus 2.4 cm².  · Mild mitral and tricuspid regurgitation.     Cardiac Cath: 8/13/21        FINAL IMPRESSION:  · Minor coronary artery disease  · Severe aortic stenosis     RECOMMENDATIONS:  · May proceed with TAVR     Indications: Evaluation for transcatheter aortic valve replacement in a patient with severe aortic stenosis     Access: Right radial  artery      Procedures:      Selective coronary angiography           Contrast: 60 ml     Angiographic Findings:      RCA: The right coronary is dominant for the posterior circulation gives rise to the PDA as well as a posterolateral branch.  The right coronary artery contains mild luminal irregularities with a maximal stenosis proximally estimated at 40%.       LMCA: The left main coronary artery gives rise to LAD and circumflex vessels and is free of disease.     Circumflex: Circumflex coronary gives rise to a tiny first and second obtuse marginal branch large multiple branching third obtuse marginal small fourth obtuse marginal a large fifth obtuse marginal and a small 6 obtuse marginal branch.  The circumflex contains a narrowing estimated at 40 to 50% in the proximal portion of the fifth obtuse marginal branch.     LAD: The LAD gives rise to a small first diagonal branch moderate second diagonal branch several small diagonal branches and terminates as a small apical recurrent branch.  The LAD contains a mild narrowing in the mid segment of the vessel just after the takeoff of the second obtuse marginal which is estimated at 40 to 50%.       CTA Chest, Abdomen, and Pelvis: Scheduled for 8/16/21    Carotid Doppler: 8/13/21    · Mild heterogeneous carotid atherosclerosis bilaterally.  · Right internal carotid artery stenosis of 0-49%.  · Left internal carotid artery stenosis of 0-49%.           Functional Assessment Data:    KCCQ12 Questionnaire Score: (see scanned copy) : 39/70      Paulino Basic Activities of Daily Living (ADL) Scale    Bathing (sponge bath, tub bath, or shower).  Receives either no assistance,   or assistance with bathing only on body part.   Yes    Dressing Gets clothes and dresses without any assistance, except for  tying shoes.        Yes    Toileting Goes to toilet room, uses toilet, arranges clothes, and returns  without any assistance (may use cane or walker for support and may use  bedpan /  urinal at night.      Yes    Transferring Moves into and out of bed and chair without assistance  (may use cane or walker)      Yes    Continence Controls bowel and bladder completely without occasional  accidents        Yes    Feeding Feeds self without assistance (except for help with cutting meat  or buttering bread)       Yes        Total (Number of Yesses of 6) 6/6      [place] Instrumental Activities of Daily Living Scale (IADL)    Ability to Use Telephone   · Operates telephone on own initiative.  Looks up and dials numbers, etc.         1  Shopping  · Takes care of all shopping needs indepently  1    Food Preparation  · Plans, prepares, and serves adequate meals independently          1  Housekeeping  · Maintains house alone or with occasional assistance,   e.g. heavy work domestic help    1     Laundry  Does personal laundry completely   1     Mode of Transportation  · Travels independently on public transportation or drives own car          1  Responsibility for Own Medications  · Is responsible for taking medications in correct dosages at correct time          1  Ability to Handle Finances  · Manages financial matters independently (budgets, writes checks,   pays rent, bills, goes to bank), collects and keeps track of income          1     Total (Number of Instrumental Activities of 8) 8/8          STS risk of mortality with open AVR: 1.9%  http://riskcalc.sts.org/stswebriskcalc/calculate       Creatinine Clearance: 59 ml/min  [email or web address]://reference.[employer]/calculator/creatinine-clearance-cockcroft-gault    Assessment/ Plan:       1. Severe aortic valve stenosis.  Low risk gentleman.  Provided his CTA shows he is suitable candidate for TAVR, this is his desired treatment plan.   2. Non-obstructive CAD.  ASA, statin, BB   3. Diastolic heart failure r/t valvular disease.  [place]lty NYHA class III   4. Type 2 DM    5. HTN     TAVR education materials reviewed with patient/ sister today.  This included  printed brochure detailing pathophysiology of aortic stenosis, patient specific aortic stenosis symptoms, and treatment options (medical therapy, surgical aortic valve replacement, and transcatheter aortic valve replacement).  A model of the valve and procedural animation were also used to explain TAVR.      We discussed patient's goals of care:  Maintain his independence and regain functional capacity.  We reviewed the [facility] Cardiosmart Shared Decision Making Tool for treatment of Aortic Stenosis to again compare/contrast the options of TAVR/ SAVR/ medical management. He wishes to pursue TAVR if his CTA study is acceptable.    Patient has Living Will:  No not currently.  He states his sister understands his healthcare wishes and would help to institute them should he be unable to communicate.      Our talk also included procedural details, procedure risks, anticipated pre-op and post-op expectations, as well as follow up visit schedule @ one month and one year.  Further discussion and final decision making will occur with Multi- Disciplinary Heart Team following the completion of pre-requisite testing.  Anticipate TAVR procedure later this month (August).    KATARINA Lloyd, 08/16/21, 15:45 EDT

## 2021-08-15 LAB
BH CV ECHO MEAS - AO MAX PG (FULL): 60.8 MMHG
BH CV ECHO MEAS - AO MAX PG: 67 MMHG
BH CV ECHO MEAS - AO MEAN PG (FULL): 34.8 MMHG
BH CV ECHO MEAS - AO MEAN PG: 38 MMHG
BH CV ECHO MEAS - AO V2 MAX: 410 CM/SEC
BH CV ECHO MEAS - AO V2 MEAN: 283.3 CM/SEC
BH CV ECHO MEAS - AO V2 VTI: 78.8 CM
BH CV ECHO MEAS - AVA(I,A): 1.1 CM^2
BH CV ECHO MEAS - AVA(I,D): 1.1 CM^2
BH CV ECHO MEAS - AVA(V,A): 0.89 CM^2
BH CV ECHO MEAS - AVA(V,D): 0.89 CM^2
BH CV ECHO MEAS - BSA(HAYCOCK): 2.4 M^2
BH CV ECHO MEAS - BSA: 2.3 M^2
BH CV ECHO MEAS - BZI_BMI: 32.5 KILOGRAMS/M^2
BH CV ECHO MEAS - BZI_METRIC_HEIGHT: 185.4 CM
BH CV ECHO MEAS - BZI_METRIC_WEIGHT: 111.6 KG
BH CV ECHO MEAS - LV MAX PG: 3.8 MMHG
BH CV ECHO MEAS - LV MEAN PG: 2 MMHG
BH CV ECHO MEAS - LV V1 MAX: 97.3 CM/SEC
BH CV ECHO MEAS - LV V1 MEAN: 70.2 CM/SEC
BH CV ECHO MEAS - LV V1 VTI: 24.6 CM
BH CV ECHO MEAS - LVOT AREA: 3.5 CM^2
BH CV ECHO MEAS - LVOT DIAM: 2.1 CM
BH CV ECHO MEAS - SI(LVOT): 39.7 ML/M^2
BH CV ECHO MEAS - SV(LVOT): 93.4 ML
BH CV VAS BP RIGHT ARM: NORMAL MMHG
LV EF 2D ECHO EST: 55 %
MAXIMAL PREDICTED HEART RATE: 151 BPM
STRESS TARGET HR: 128 BPM

## 2021-08-16 ENCOUNTER — HOSPITAL ENCOUNTER (OUTPATIENT)
Dept: CT IMAGING | Facility: HOSPITAL | Age: 71
Discharge: HOME | End: 2021-08-16
Admitting: NURSE PRACTITIONER

## 2021-08-16 VITALS
RESPIRATION RATE: 16 BRPM | DIASTOLIC BLOOD PRESSURE: 90 MMHG | SYSTOLIC BLOOD PRESSURE: 164 MMHG | OXYGEN SATURATION: 98 % | TEMPERATURE: 97.8 F | HEART RATE: 55 BPM | BODY MASS INDEX: 32.47 KG/M2 | WEIGHT: 245 LBS | HEIGHT: 73 IN

## 2021-08-16 DIAGNOSIS — I35.0 AORTIC STENOSIS, MODERATE: ICD-10-CM

## 2021-08-16 DIAGNOSIS — I95.1 ORTHOSTATIC HYPOTENSION: ICD-10-CM

## 2021-08-16 LAB — GLUCOSE BLDC GLUCOMTR-MCNC: 153 MG/DL (ref 70–130)

## 2021-08-16 PROCEDURE — 82962 GLUCOSE BLOOD TEST: CPT

## 2021-08-16 PROCEDURE — 82565 ASSAY OF CREATININE: CPT

## 2021-08-16 PROCEDURE — 74174 CTA ABD&PLVS W/CONTRAST: CPT

## 2021-08-16 PROCEDURE — 71275 CT ANGIOGRAPHY CHEST: CPT

## 2021-08-16 PROCEDURE — 0 IOPAMIDOL PER 1 ML: Performed by: NURSE PRACTITIONER

## 2021-08-16 RX ADMIN — IOPAMIDOL 100 ML: 755 INJECTION, SOLUTION INTRAVENOUS at 10:04

## 2021-08-16 NOTE — NURSING NOTE
Given printed and oral discharge instructions. Verbalizes understanding. Discharged ambulatory to front entrance.

## 2021-08-19 ENCOUNTER — TELEPHONE (OUTPATIENT)
Dept: CARDIOLOGY | Facility: HOSPITAL | Age: 71
End: 2021-08-19

## 2021-08-19 LAB — CREAT BLDA-MCNC: 1.7 MG/DL (ref 0.6–1.3)

## 2021-08-19 NOTE — TELEPHONE ENCOUNTER
TAVR APRN    Contacted patient re: TAVR procedure scheduling.  We had originally tentatively planned for him to come for TAVR procedure 8/26.  Since our TAVR cases 8/16 were canceled due to COVID related ICU bed availability, those cases have moved to 8/26.  His procedure date will move to September.  When a date is scheduled, I will let him know.    Advised Mr. Cruz to please report to ER for more frequent of worsening chest pain,  Syncope, or dyspnea at rest.      Mary BRAY

## 2021-09-10 DIAGNOSIS — I35.0 AORTIC STENOSIS, SEVERE: Primary | ICD-10-CM

## 2021-09-14 ENCOUNTER — TELEPHONE (OUTPATIENT)
Dept: CARDIOLOGY | Facility: HOSPITAL | Age: 71
End: 2021-09-14

## 2021-09-22 ENCOUNTER — APPOINTMENT (OUTPATIENT)
Dept: PREADMISSION TESTING | Facility: HOSPITAL | Age: 71
End: 2021-09-22

## 2021-10-11 ENCOUNTER — PREP FOR SURGERY (OUTPATIENT)
Dept: OTHER | Facility: HOSPITAL | Age: 71
End: 2021-10-11

## 2021-10-11 DIAGNOSIS — I35.0 AORTIC STENOSIS, SEVERE: Primary | ICD-10-CM

## 2021-10-11 RX ORDER — CHLORHEXIDINE GLUCONATE 0.12 MG/ML
15 RINSE ORAL ONCE
Status: CANCELLED | OUTPATIENT
Start: 2021-10-18 | End: 2021-10-18

## 2021-10-11 RX ORDER — NITROGLYCERIN 0.4 MG/1
0.4 TABLET SUBLINGUAL
Status: CANCELLED | OUTPATIENT
Start: 2021-10-18

## 2021-10-11 RX ORDER — CHLORHEXIDINE GLUCONATE 500 MG/1
1 CLOTH TOPICAL EVERY 12 HOURS PRN
Status: CANCELLED | OUTPATIENT
Start: 2021-10-15

## 2021-10-11 RX ORDER — ASPIRIN 325 MG
325 TABLET ORAL NIGHTLY
Status: CANCELLED | OUTPATIENT
Start: 2021-10-15 | End: 2021-10-16

## 2021-10-11 RX ORDER — CHLORHEXIDINE GLUCONATE 500 MG/1
1 CLOTH TOPICAL EVERY 12 HOURS PRN
Status: CANCELLED | OUTPATIENT
Start: 2021-10-18

## 2021-10-11 RX ORDER — ACETAMINOPHEN 325 MG/1
650 TABLET ORAL EVERY 4 HOURS PRN
Status: CANCELLED | OUTPATIENT
Start: 2021-10-18

## 2021-10-15 ENCOUNTER — HOSPITAL ENCOUNTER (OUTPATIENT)
Dept: PULMONOLOGY | Facility: HOSPITAL | Age: 71
Discharge: HOME | End: 2021-10-15

## 2021-10-15 ENCOUNTER — HOSPITAL ENCOUNTER (OUTPATIENT)
Dept: GENERAL RADIOLOGY | Facility: HOSPITAL | Age: 71
Discharge: HOME | End: 2021-10-15

## 2021-10-15 ENCOUNTER — ANESTHESIA EVENT (OUTPATIENT)
Dept: PERIOP | Facility: HOSPITAL | Age: 71
End: 2021-10-15

## 2021-10-15 ENCOUNTER — PRE-ADMISSION TESTING (OUTPATIENT)
Dept: PREADMISSION TESTING | Facility: HOSPITAL | Age: 71
End: 2021-10-15

## 2021-10-15 VITALS — HEIGHT: 73 IN | BODY MASS INDEX: 32.72 KG/M2 | OXYGEN SATURATION: 96 % | WEIGHT: 246.91 LBS

## 2021-10-15 DIAGNOSIS — I35.0 AORTIC STENOSIS, SEVERE: ICD-10-CM

## 2021-10-15 LAB
ABO GROUP BLD: NORMAL
ALBUMIN SERPL-MCNC: 4.5 G/DL (ref 3.5–5.2)
ALBUMIN/GLOB SERPL: 2 G/DL
ALP SERPL-CCNC: 52 U/L (ref 39–117)
ALT SERPL W P-5'-P-CCNC: 25 U/L (ref 1–41)
AMPHET+METHAMPHET UR QL: NEGATIVE
AMPHETAMINES UR QL: NEGATIVE
ANION GAP SERPL CALCULATED.3IONS-SCNC: 13 MMOL/L (ref 5–15)
APTT PPP: 34.5 SECONDS (ref 22–39)
AST SERPL-CCNC: 27 U/L (ref 1–40)
BARBITURATES UR QL SCN: NEGATIVE
BASOPHILS # BLD AUTO: 0.04 10*3/MM3 (ref 0–0.2)
BASOPHILS NFR BLD AUTO: 0.6 % (ref 0–1.5)
BENZODIAZ UR QL SCN: NEGATIVE
BILIRUB SERPL-MCNC: 0.3 MG/DL (ref 0–1.2)
BLD GP AB SCN SERPL QL: NEGATIVE
BUN SERPL-MCNC: 35 MG/DL (ref 8–23)
BUN/CREAT SERPL: 22.4 (ref 7–25)
BUPRENORPHINE SERPL-MCNC: NEGATIVE NG/ML
CALCIUM SPEC-SCNC: 9.7 MG/DL (ref 8.6–10.5)
CANNABINOIDS SERPL QL: NEGATIVE
CHLORIDE SERPL-SCNC: 103 MMOL/L (ref 98–107)
CO2 SERPL-SCNC: 22 MMOL/L (ref 22–29)
COCAINE UR QL: NEGATIVE
CREAT SERPL-MCNC: 1.56 MG/DL (ref 0.76–1.27)
DEPRECATED RDW RBC AUTO: 45.5 FL (ref 37–54)
EOSINOPHIL # BLD AUTO: 0.15 10*3/MM3 (ref 0–0.4)
EOSINOPHIL NFR BLD AUTO: 2.2 % (ref 0.3–6.2)
ERYTHROCYTE [DISTWIDTH] IN BLOOD BY AUTOMATED COUNT: 15.3 % (ref 12.3–15.4)
GFR SERPL CREATININE-BSD FRML MDRD: 44 ML/MIN/1.73
GLOBULIN UR ELPH-MCNC: 2.2 GM/DL
GLUCOSE SERPL-MCNC: 187 MG/DL (ref 65–99)
HBA1C MFR BLD: 6.7 % (ref 4.8–5.6)
HCT VFR BLD AUTO: 42.2 % (ref 37.5–51)
HGB BLD-MCNC: 13.8 G/DL (ref 13–17.7)
IMM GRANULOCYTES # BLD AUTO: 0.03 10*3/MM3 (ref 0–0.05)
IMM GRANULOCYTES NFR BLD AUTO: 0.4 % (ref 0–0.5)
INR PPP: 1.03 (ref 0.85–1.16)
LYMPHOCYTES # BLD AUTO: 1.18 10*3/MM3 (ref 0.7–3.1)
LYMPHOCYTES NFR BLD AUTO: 17.6 % (ref 19.6–45.3)
MAGNESIUM SERPL-MCNC: 2.2 MG/DL (ref 1.6–2.4)
MCH RBC QN AUTO: 27.1 PG (ref 26.6–33)
MCHC RBC AUTO-ENTMCNC: 32.7 G/DL (ref 31.5–35.7)
MCV RBC AUTO: 82.9 FL (ref 79–97)
METHADONE UR QL SCN: NEGATIVE
MONOCYTES # BLD AUTO: 0.48 10*3/MM3 (ref 0.1–0.9)
MONOCYTES NFR BLD AUTO: 7.2 % (ref 5–12)
NEUTROPHILS NFR BLD AUTO: 4.82 10*3/MM3 (ref 1.7–7)
NEUTROPHILS NFR BLD AUTO: 72 % (ref 42.7–76)
NRBC BLD AUTO-RTO: 0 /100 WBC (ref 0–0.2)
OPIATES UR QL: NEGATIVE
OXYCODONE UR QL SCN: NEGATIVE
PA ADP PRP-ACNC: 216 PRU
PCP UR QL SCN: NEGATIVE
PLATELET # BLD AUTO: 191 10*3/MM3 (ref 140–450)
PMV BLD AUTO: 9.9 FL (ref 6–12)
POTASSIUM SERPL-SCNC: 4.1 MMOL/L (ref 3.5–5.2)
PROPOXYPH UR QL: NEGATIVE
PROT SERPL-MCNC: 6.7 G/DL (ref 6–8.5)
PROTHROMBIN TIME: 13.2 SECONDS (ref 11.4–14.4)
RBC # BLD AUTO: 5.09 10*6/MM3 (ref 4.14–5.8)
RH BLD: POSITIVE
SARS-COV-2 RNA RESP QL NAA+PROBE: NOT DETECTED
SODIUM SERPL-SCNC: 138 MMOL/L (ref 136–145)
T&S EXPIRATION DATE: NORMAL
TRICYCLICS UR QL SCN: NEGATIVE
WBC # BLD AUTO: 6.7 10*3/MM3 (ref 3.4–10.8)

## 2021-10-15 PROCEDURE — 85610 PROTHROMBIN TIME: CPT

## 2021-10-15 PROCEDURE — 94010 BREATHING CAPACITY TEST: CPT | Performed by: INTERNAL MEDICINE

## 2021-10-15 PROCEDURE — C9803 HOPD COVID-19 SPEC COLLECT: HCPCS

## 2021-10-15 PROCEDURE — 94010 BREATHING CAPACITY TEST: CPT

## 2021-10-15 PROCEDURE — 85576 BLOOD PLATELET AGGREGATION: CPT

## 2021-10-15 PROCEDURE — 80306 DRUG TEST PRSMV INSTRMNT: CPT

## 2021-10-15 PROCEDURE — 85025 COMPLETE CBC W/AUTO DIFF WBC: CPT

## 2021-10-15 PROCEDURE — 80053 COMPREHEN METABOLIC PANEL: CPT

## 2021-10-15 PROCEDURE — 71046 X-RAY EXAM CHEST 2 VIEWS: CPT

## 2021-10-15 PROCEDURE — U0003 INFECTIOUS AGENT DETECTION BY NUCLEIC ACID (DNA OR RNA); SEVERE ACUTE RESPIRATORY SYNDROME CORONAVIRUS 2 (SARS-COV-2) (CORONAVIRUS DISEASE [COVID-19]), AMPLIFIED PROBE TECHNIQUE, MAKING USE OF HIGH THROUGHPUT TECHNOLOGIES AS DESCRIBED BY CMS-2020-01-R: HCPCS

## 2021-10-15 PROCEDURE — 83735 ASSAY OF MAGNESIUM: CPT

## 2021-10-15 PROCEDURE — 85730 THROMBOPLASTIN TIME PARTIAL: CPT

## 2021-10-15 PROCEDURE — 86923 COMPATIBILITY TEST ELECTRIC: CPT

## 2021-10-15 PROCEDURE — 36415 COLL VENOUS BLD VENIPUNCTURE: CPT

## 2021-10-15 PROCEDURE — U0005 INFEC AGEN DETEC AMPLI PROBE: HCPCS

## 2021-10-15 PROCEDURE — 86850 RBC ANTIBODY SCREEN: CPT

## 2021-10-15 PROCEDURE — 86901 BLOOD TYPING SEROLOGIC RH(D): CPT

## 2021-10-15 PROCEDURE — 86900 BLOOD TYPING SEROLOGIC ABO: CPT

## 2021-10-15 PROCEDURE — 83036 HEMOGLOBIN GLYCOSYLATED A1C: CPT

## 2021-10-15 NOTE — PAT
Patient to apply Chlorhexadine wipes  to surgical area (as instructed) the night before procedure and the AM of procedure. Wipes provided.    Patient did not review general PAT education video as instructed in their preoperative information received from their surgeon.  One-on-one Pre Admission Testing general education provided during PAT visit.  Copies of [facility] general education handouts (Incentive Spirometry, Meds to Beds Program, Patient Belongings, Pre-op skin preparation instructions, Blood Glucose testing, Visitor policy, Surgery FAQ, Code H) distributed to patient. Encouraged patient/family to read PAT general education handouts thoroughly and notify PAT staff with any questions or concerns. Patient instructed to bring PAT pass and completed skin prep sheet (if applicable) on the day of procedure. Patient verbalized understanding of all information and priority content.     Patient directed to Radiology Department for CXR after Pre Admission Testing Appointment.     Instructed patient to take 325 mg the night before heart surgery as per CT surgeon's order.  Patient and/or family verbalized understanding.    Blood bank bracelet applied to patient during Pre Admission Testing visit.  Patient instructed not to remove from arm until after procedure and they are discharged from the hospital.  Explained to patient that they may shower and get the bracelet wet, but not to immerse under water for longer periods (bathing, swimming, hand dishwashing, etc).  Patient verbalized understanding.    Patient given instructions on how to apply bactroban the evening before surgery. Verbalized understanding.

## 2021-10-15 NOTE — DISCHARGE INSTRUCTIONS
The following information and instructions were given:    Do not eat, drink, smoke or chew gum after midnight the night before surgery. This includes no mints.  Take all routine, prescribed medications including heart and blood pressure medicines with a sip of water unless otherwise instructed by your physician.   Do NOT take diabetic medication unless instructed by your physician.    DO NOT shave for two days before your procedure.  Do not wear makeup.      DO NOT wear fingernail polish (gel/regular) and/or acrylic/artificial nails on the day of surgery.   If you had a recent manicure and would rather not remove polish or artificial nails, the minimum requirement is that the polish/artificial nails must be removed from the middle finger on each hand.      If you are having surgery/procedure on an upper extremity, fingernail polish/artificial fingernails must be removed for surgery.  NO EXCEPTIONS.      If you are having surgery/procedure on a lower extremity, toenail polish on both extremities must be removed for surgery.  NO EXCEPTIONS.    Remove all jewelry (advise to go to jeweler if unable to remove).  Jewelry, especially rings, can no longer be taped for surgery.    Leave anything you consider valuable at home.    Leave your suitcase in the car until after your surgery.    Bring the following with you the day of your procedure (when applicable):       -Picture ID and insurance cards       -Co-pay/deductible required by insurance       -Medications in the original bottles (not a list) including all over-the-counter medications if not brought to PAT       -Copy of advance directive, living will or power of  documents if not brought to PAT       -CPAP or BIPAP mask and tubing (do not bring machine)       -Skin prep instruction(s) sheet       -PAT Pass    Education booklet, brochure, handout or binder related to procedure given to patient.  Education booklet also includes general information related to  their recovery that mentions signs and symptoms of infection and when to call the doctor.    When applicable, an ERAS handout/booklet was given to patient.    Pain Control After Surgery handout given to patient.    Respirex use (handout given to patient) and pneumonia prevention education provided.    Signs and Symptoms of infection discussed with patient in Pre Admission Testing.  Patient instructed to call their doctor if any of the following symptoms are noted during recovery:  Fever of 100.4 F or higher, incision that is warm or has increasing bleeding, redness or drainage.    DVT Prevention instructions given verbally during Pre Admission Testing appointment that stress the importance of ambulation to improve blood circulation.  Also encouraged patient to perform foot exercises when in bed and application of a sequential device may be applied to lower extremities to improve circulation.      Please apply Chlorhexidine wipes to surgical area (if instructed) the night before procedure and the AM of procedure and document date/time of applications on skin prep instruction sheet.    Patient/family provided a [facility] Heart Surgery book (if not already provided by the CT surgeon's office).  Encouraged patient and family to read the Heart Surgery book if they had not already done so.  Also completed Heart Surgery pre surgery checklist with patient.  Verified with patient that exercise handout was received, if not, then one was provided during PAT visit.      Education during PAT visit included general perioperative instructions that are routine for all surgical patients (PAT PASS, wipes, directions to pre-op,etc.).  Additionally, a handout was provided and discussed that stressed the importance of Honesty, Incentive Spirometry use, Ambulation, and Pain control.  This handout also explained the tubes in place during immediate post op recovery.  Verbal instructions given as well.     Patient and/or  family verbalized understanding of education and reinforcement was provided as needed.    Instructed patient to take 325 mg the night before heart surgery as per CT surgeon's order.  Patient and/or family verbalized understanding.

## 2021-10-17 RX ORDER — FAMOTIDINE 10 MG/ML
20 INJECTION, SOLUTION INTRAVENOUS ONCE
Status: CANCELLED | OUTPATIENT
Start: 2021-10-17 | End: 2021-10-17

## 2021-10-17 NOTE — ANESTHESIA PREPROCEDURE EVALUATION
Anesthesia Evaluation     Patient summary reviewed   no history of anesthetic complications:  NPO Solid Status: > 8 hours  NPO Liquid Status: > 8 hours           Airway   Mallampati: II  TM distance: >3 FB  Neck ROM: full  Dental - normal exam     Pulmonary - normal exam   (+) shortness of breath,   (-) sleep apnea, not a smoker  Cardiovascular   Exercise tolerance: good (4-7 METS)    ECG reviewed  Patient on routine beta blocker    (+) hypertension, valvular problems/murmurs AS, GLEASON, murmur, hyperlipidemia,   (-) dysrhythmias, angina, CHF, cardiac stents    ROS comment: 6/23/21- ef 60-65, severe as mean pg 46, shweta 1.1, rvsp <35    8/13/21 - minor CAD, severe AS  8/13/21 CHRISTOPHER - lvef 57, no wma, mod cLVH, nl rvsf borderline rv dilation, no slava clot, no pfo, no ai, severe as (max pg 67 mean pg 38 shweta 1.1cm)  annulus 24mm sof 33 stj 24mm, mild mr/no ms, mild tr, nl pv.    Neuro/Psych  (+) dizziness/light headedness, numbness,     GI/Hepatic/Renal/Endo    (+) obesity,   liver disease fatty liver disease, renal disease CRI, diabetes mellitus type 2,     Musculoskeletal     Abdominal    Substance History - negative use     OB/GYN          Other      history of cancer (prostate ca)    ROS/Med Hx Other: hgb 13.8 k 4.1 cr 1.56  Hx adrenal tumor -- per pt  urology found incidentally benign tumor (reports full workup including blood/urine studies, imaging, told benign and never needing medications or follow up for it)                        Anesthesia Plan    ASA 4     general   (Risks and benefits of general anesthesia discussed with patient (including MI, CVA, death, recall, aspiration, oropharyngeal/dental damage), questions answered, agreeable to proceed.  Risks of CHRISTOPHER discussed with patient (Op/dental damage, dysphagia, perforation); questions answered, agreeable to proceed.      Pre induction arterial line. )  intravenous induction     Anesthetic plan, all risks, benefits, and alternatives have been provided, discussed  and informed consent has been obtained with: patient.  Use of blood products discussed with patient  Consented to blood products.   Plan discussed with CRNA.

## 2021-10-18 ENCOUNTER — ANESTHESIA EVENT CONVERTED (OUTPATIENT)
Dept: ANESTHESIOLOGY | Facility: HOSPITAL | Age: 71
End: 2021-10-18

## 2021-10-18 ENCOUNTER — ANCILLARY PROCEDURE (OUTPATIENT)
Dept: PERIOP | Facility: HOSPITAL | Age: 71
End: 2021-10-18

## 2021-10-18 ENCOUNTER — ANESTHESIA (OUTPATIENT)
Dept: PERIOP | Facility: HOSPITAL | Age: 71
End: 2021-10-18

## 2021-10-18 ENCOUNTER — HOSPITAL ENCOUNTER (INPATIENT)
Facility: HOSPITAL | Age: 71
LOS: 1 days | Discharge: HOME | End: 2021-10-19
Attending: THORACIC SURGERY (CARDIOTHORACIC VASCULAR SURGERY) | Admitting: THORACIC SURGERY (CARDIOTHORACIC VASCULAR SURGERY)

## 2021-10-18 DIAGNOSIS — I35.0 AORTIC STENOSIS, SEVERE: ICD-10-CM

## 2021-10-18 PROBLEM — E66.9 CLASS 1 OBESITY IN ADULT: Chronic | Status: ACTIVE | Noted: 2021-10-18

## 2021-10-18 PROBLEM — R42 DIZZINESS: Status: RESOLVED | Noted: 2021-08-03 | Resolved: 2021-10-18

## 2021-10-18 PROBLEM — N18.9 CKD (CHRONIC KIDNEY DISEASE): Chronic | Status: ACTIVE | Noted: 2021-10-18

## 2021-10-18 PROBLEM — E11.9 TYPE 2 DIABETES MELLITUS: Status: ACTIVE | Noted: 2021-10-18

## 2021-10-18 PROBLEM — I10 HYPERTENSION: Status: ACTIVE | Noted: 2021-10-18

## 2021-10-18 PROBLEM — N18.9 CKD (CHRONIC KIDNEY DISEASE): Status: ACTIVE | Noted: 2021-10-18

## 2021-10-18 PROBLEM — I95.1 ORTHOSTATIC HYPOTENSION: Status: RESOLVED | Noted: 2021-07-21 | Resolved: 2021-10-18

## 2021-10-18 PROBLEM — E66.9 CLASS 1 OBESITY IN ADULT: Status: ACTIVE | Noted: 2021-10-18

## 2021-10-18 PROBLEM — E66.811 CLASS 1 OBESITY IN ADULT: Status: ACTIVE | Noted: 2021-10-18

## 2021-10-18 PROBLEM — Z90.79 H/O PROSTATECTOMY: Status: ACTIVE | Noted: 2021-10-18

## 2021-10-18 PROBLEM — R06.09 DOE (DYSPNEA ON EXERTION): Status: RESOLVED | Noted: 2021-07-21 | Resolved: 2021-10-18

## 2021-10-18 PROBLEM — E11.9 TYPE 2 DIABETES MELLITUS (HCC): Chronic | Status: ACTIVE | Noted: 2021-10-18

## 2021-10-18 PROBLEM — E78.5 DYSLIPIDEMIA: Status: ACTIVE | Noted: 2021-10-18

## 2021-10-18 PROBLEM — E78.5 DYSLIPIDEMIA: Chronic | Status: ACTIVE | Noted: 2021-10-18

## 2021-10-18 PROBLEM — I10 HYPERTENSION: Chronic | Status: ACTIVE | Noted: 2021-10-18

## 2021-10-18 PROBLEM — E66.811 CLASS 1 OBESITY IN ADULT: Chronic | Status: ACTIVE | Noted: 2021-10-18

## 2021-10-18 LAB
ABO GROUP BLD: NORMAL
ACT BLD: 125 SECONDS (ref 82–152)
ACT BLD: 323 SECONDS (ref 82–152)
ANION GAP SERPL CALCULATED.3IONS-SCNC: 10 MMOL/L (ref 5–15)
APTT PPP: 37.8 SECONDS (ref 22–39)
BASE EXCESS BLDA CALC-SCNC: 0 MMOL/L (ref -5–5)
BUN SERPL-MCNC: 22 MG/DL (ref 8–23)
BUN/CREAT SERPL: 16.1 (ref 7–25)
CA-I BLDA-SCNC: 1.17 MMOL/L (ref 1.2–1.32)
CALCIUM SPEC-SCNC: 8.8 MG/DL (ref 8.6–10.5)
CHLORIDE SERPL-SCNC: 105 MMOL/L (ref 98–107)
CO2 BLDA-SCNC: 26 MMOL/L (ref 24–29)
CO2 SERPL-SCNC: 23 MMOL/L (ref 22–29)
CREAT SERPL-MCNC: 1.37 MG/DL (ref 0.76–1.27)
DEPRECATED RDW RBC AUTO: 46.3 FL (ref 37–54)
ERYTHROCYTE [DISTWIDTH] IN BLOOD BY AUTOMATED COUNT: 15.3 % (ref 12.3–15.4)
GFR SERPL CREATININE-BSD FRML MDRD: 52 ML/MIN/1.73
GLUCOSE BLDC GLUCOMTR-MCNC: 110 MG/DL (ref 70–130)
GLUCOSE BLDC GLUCOMTR-MCNC: 131 MG/DL (ref 70–130)
GLUCOSE BLDC GLUCOMTR-MCNC: 160 MG/DL (ref 70–130)
GLUCOSE BLDC GLUCOMTR-MCNC: 201 MG/DL (ref 70–130)
GLUCOSE BLDC GLUCOMTR-MCNC: 223 MG/DL (ref 70–130)
GLUCOSE SERPL-MCNC: 173 MG/DL (ref 65–99)
HCO3 BLDA-SCNC: 24.7 MMOL/L (ref 22–26)
HCT VFR BLD AUTO: 39.1 % (ref 37.5–51)
HCT VFR BLDA CALC: 36 % (ref 38–51)
HGB BLD-MCNC: 12.9 G/DL (ref 13–17.7)
HGB BLDA-MCNC: 12.2 G/DL (ref 12–17)
MCH RBC QN AUTO: 27.4 PG (ref 26.6–33)
MCHC RBC AUTO-ENTMCNC: 33 G/DL (ref 31.5–35.7)
MCV RBC AUTO: 83.2 FL (ref 79–97)
PCO2 BLDA: 39.7 MM HG (ref 35–45)
PH BLDA: 7.4 PH UNITS (ref 7.35–7.6)
PLATELET # BLD AUTO: 175 10*3/MM3 (ref 140–450)
PMV BLD AUTO: 10 FL (ref 6–12)
PO2 BLDA: 278 MMHG (ref 80–105)
POTASSIUM BLDA-SCNC: 4.1 MMOL/L (ref 3.5–4.9)
POTASSIUM SERPL-SCNC: 4 MMOL/L (ref 3.5–5.2)
QT INTERVAL: 436 MS
QTC INTERVAL: 449 MS
RBC # BLD AUTO: 4.7 10*6/MM3 (ref 4.14–5.8)
RH BLD: POSITIVE
SAO2 % BLDA: 100 % (ref 95–98)
SODIUM BLD-SCNC: 137 MMOL/L (ref 138–146)
SODIUM SERPL-SCNC: 138 MMOL/L (ref 136–145)
WBC # BLD AUTO: 12.4 10*3/MM3 (ref 3.4–10.8)

## 2021-10-18 PROCEDURE — 99233 SBSQ HOSP IP/OBS HIGH 50: CPT | Performed by: INTERNAL MEDICINE

## 2021-10-18 PROCEDURE — S0260 H&P FOR SURGERY: HCPCS | Performed by: THORACIC SURGERY (CARDIOTHORACIC VASCULAR SURGERY)

## 2021-10-18 PROCEDURE — 85730 THROMBOPLASTIN TIME PARTIAL: CPT | Performed by: PHYSICIAN ASSISTANT

## 2021-10-18 PROCEDURE — 93005 ELECTROCARDIOGRAM TRACING: CPT | Performed by: PHYSICIAN ASSISTANT

## 2021-10-18 PROCEDURE — C1769 GUIDE WIRE: HCPCS | Performed by: THORACIC SURGERY (CARDIOTHORACIC VASCULAR SURGERY)

## 2021-10-18 PROCEDURE — 82947 ASSAY GLUCOSE BLOOD QUANT: CPT

## 2021-10-18 PROCEDURE — 02RF38Z REPLACEMENT OF AORTIC VALVE WITH ZOOPLASTIC TISSUE, PERCUTANEOUS APPROACH: ICD-10-PCS | Performed by: THORACIC SURGERY (CARDIOTHORACIC VASCULAR SURGERY)

## 2021-10-18 PROCEDURE — C1760 CLOSURE DEV, VASC: HCPCS | Performed by: THORACIC SURGERY (CARDIOTHORACIC VASCULAR SURGERY)

## 2021-10-18 PROCEDURE — 86900 BLOOD TYPING SEROLOGIC ABO: CPT

## 2021-10-18 PROCEDURE — 84295 ASSAY OF SERUM SODIUM: CPT

## 2021-10-18 PROCEDURE — 84132 ASSAY OF SERUM POTASSIUM: CPT

## 2021-10-18 PROCEDURE — 80048 BASIC METABOLIC PNL TOTAL CA: CPT | Performed by: PHYSICIAN ASSISTANT

## 2021-10-18 PROCEDURE — 25010000002 DEXAMETHASONE PER 1 MG: Performed by: NURSE ANESTHETIST, CERTIFIED REGISTERED

## 2021-10-18 PROCEDURE — 63710000001 INSULIN LISPRO (HUMAN) PER 5 UNITS: Performed by: INTERNAL MEDICINE

## 2021-10-18 PROCEDURE — 86901 BLOOD TYPING SEROLOGIC RH(D): CPT

## 2021-10-18 PROCEDURE — 94799 UNLISTED PULMONARY SVC/PX: CPT

## 2021-10-18 PROCEDURE — 93318 ECHO TRANSESOPHAGEAL INTRAOP: CPT | Performed by: ANESTHESIOLOGY

## 2021-10-18 PROCEDURE — C1887 CATHETER, GUIDING: HCPCS | Performed by: THORACIC SURGERY (CARDIOTHORACIC VASCULAR SURGERY)

## 2021-10-18 PROCEDURE — C1894 INTRO/SHEATH, NON-LASER: HCPCS | Performed by: THORACIC SURGERY (CARDIOTHORACIC VASCULAR SURGERY)

## 2021-10-18 PROCEDURE — 25010000002 ONDANSETRON PER 1 MG: Performed by: NURSE ANESTHETIST, CERTIFIED REGISTERED

## 2021-10-18 PROCEDURE — 93355 ECHO TRANSESOPHAGEAL (TEE): CPT | Performed by: ANESTHESIOLOGY

## 2021-10-18 PROCEDURE — 25010000002 HEPARIN (PORCINE) PER 1000 UNITS: Performed by: THORACIC SURGERY (CARDIOTHORACIC VASCULAR SURGERY)

## 2021-10-18 PROCEDURE — 33361 REPLACE AORTIC VALVE PERQ: CPT | Performed by: THORACIC SURGERY (CARDIOTHORACIC VASCULAR SURGERY)

## 2021-10-18 PROCEDURE — C1889 IMPLANT/INSERT DEVICE, NOC: HCPCS | Performed by: THORACIC SURGERY (CARDIOTHORACIC VASCULAR SURGERY)

## 2021-10-18 PROCEDURE — 0 CEFUROXIME SODIUM 1.5 G RECONSTITUTED SOLUTION

## 2021-10-18 PROCEDURE — 82803 BLOOD GASES ANY COMBINATION: CPT

## 2021-10-18 PROCEDURE — B245ZZ4 ULTRASONOGRAPHY OF LEFT HEART, TRANSESOPHAGEAL: ICD-10-PCS | Performed by: THORACIC SURGERY (CARDIOTHORACIC VASCULAR SURGERY)

## 2021-10-18 PROCEDURE — 33361 REPLACE AORTIC VALVE PERQ: CPT | Performed by: INTERNAL MEDICINE

## 2021-10-18 PROCEDURE — B41F1ZZ FLUOROSCOPY OF RIGHT LOWER EXTREMITY ARTERIES USING LOW OSMOLAR CONTRAST: ICD-10-PCS | Performed by: THORACIC SURGERY (CARDIOTHORACIC VASCULAR SURGERY)

## 2021-10-18 PROCEDURE — 85347 COAGULATION TIME ACTIVATED: CPT

## 2021-10-18 PROCEDURE — 25010000002 HEPARIN (PORCINE) PER 1000 UNITS: Performed by: NURSE ANESTHETIST, CERTIFIED REGISTERED

## 2021-10-18 PROCEDURE — 25010000002 PROTAMINE SULFATE PER 10 MG: Performed by: NURSE ANESTHETIST, CERTIFIED REGISTERED

## 2021-10-18 PROCEDURE — 85014 HEMATOCRIT: CPT

## 2021-10-18 PROCEDURE — 0 IOPAMIDOL PER 1 ML: Performed by: THORACIC SURGERY (CARDIOTHORACIC VASCULAR SURGERY)

## 2021-10-18 PROCEDURE — 82330 ASSAY OF CALCIUM: CPT

## 2021-10-18 PROCEDURE — 93355 ECHO TRANSESOPHAGEAL (TEE): CPT

## 2021-10-18 PROCEDURE — 85027 COMPLETE CBC AUTOMATED: CPT | Performed by: PHYSICIAN ASSISTANT

## 2021-10-18 DEVICE — VLV HEART TRNSCATH SAPIEN/COMMANDER 26MM: Type: IMPLANTABLE DEVICE | Site: AORTA | Status: FUNCTIONAL

## 2021-10-18 RX ORDER — ONDANSETRON 2 MG/ML
INJECTION INTRAMUSCULAR; INTRAVENOUS AS NEEDED
Status: DISCONTINUED | OUTPATIENT
Start: 2021-10-18 | End: 2021-10-18 | Stop reason: SURG

## 2021-10-18 RX ORDER — NOREPINEPHRINE BIT/0.9 % NACL 8 MG/250ML
.02-.3 INFUSION BOTTLE (ML) INTRAVENOUS
Status: DISCONTINUED | OUTPATIENT
Start: 2021-10-18 | End: 2021-10-19 | Stop reason: HOSPADM

## 2021-10-18 RX ORDER — POLYETHYLENE GLYCOL 3350 17 G/17G
17 POWDER, FOR SOLUTION ORAL DAILY
Status: DISCONTINUED | OUTPATIENT
Start: 2021-10-18 | End: 2021-10-19 | Stop reason: HOSPADM

## 2021-10-18 RX ORDER — AMOXICILLIN 250 MG
2 CAPSULE ORAL NIGHTLY
Status: DISCONTINUED | OUTPATIENT
Start: 2021-10-18 | End: 2021-10-19 | Stop reason: HOSPADM

## 2021-10-18 RX ORDER — HYDRALAZINE HYDROCHLORIDE 20 MG/ML
10 INJECTION INTRAMUSCULAR; INTRAVENOUS EVERY 6 HOURS PRN
Status: DISCONTINUED | OUTPATIENT
Start: 2021-10-18 | End: 2021-10-19 | Stop reason: HOSPADM

## 2021-10-18 RX ORDER — NITROGLYCERIN 0.4 MG/1
0.4 TABLET SUBLINGUAL
Status: DISCONTINUED | OUTPATIENT
Start: 2021-10-18 | End: 2021-10-18 | Stop reason: HOSPADM

## 2021-10-18 RX ORDER — LIDOCAINE HYDROCHLORIDE 10 MG/ML
INJECTION, SOLUTION EPIDURAL; INFILTRATION; INTRACAUDAL; PERINEURAL AS NEEDED
Status: DISCONTINUED | OUTPATIENT
Start: 2021-10-18 | End: 2021-10-18 | Stop reason: SURG

## 2021-10-18 RX ORDER — LIDOCAINE HYDROCHLORIDE 10 MG/ML
0.5 INJECTION, SOLUTION EPIDURAL; INFILTRATION; INTRACAUDAL; PERINEURAL ONCE AS NEEDED
Status: COMPLETED | OUTPATIENT
Start: 2021-10-18 | End: 2021-10-18

## 2021-10-18 RX ORDER — MIDAZOLAM HYDROCHLORIDE 1 MG/ML
0.5 INJECTION INTRAMUSCULAR; INTRAVENOUS
Status: DISCONTINUED | OUTPATIENT
Start: 2021-10-18 | End: 2021-10-18 | Stop reason: HOSPADM

## 2021-10-18 RX ORDER — LABETALOL HYDROCHLORIDE 5 MG/ML
INJECTION, SOLUTION INTRAVENOUS AS NEEDED
Status: DISCONTINUED | OUTPATIENT
Start: 2021-10-18 | End: 2021-10-18 | Stop reason: SURG

## 2021-10-18 RX ORDER — SODIUM CHLORIDE 0.9 % (FLUSH) 0.9 %
10 SYRINGE (ML) INJECTION AS NEEDED
Status: DISCONTINUED | OUTPATIENT
Start: 2021-10-18 | End: 2021-10-18 | Stop reason: HOSPADM

## 2021-10-18 RX ORDER — SODIUM CHLORIDE 9 MG/ML
100 INJECTION, SOLUTION INTRAVENOUS CONTINUOUS
Status: ACTIVE | OUTPATIENT
Start: 2021-10-18 | End: 2021-10-18

## 2021-10-18 RX ORDER — SODIUM CHLORIDE 9 MG/ML
INJECTION, SOLUTION INTRAVENOUS CONTINUOUS PRN
Status: DISCONTINUED | OUTPATIENT
Start: 2021-10-18 | End: 2021-10-18 | Stop reason: SURG

## 2021-10-18 RX ORDER — CHLORHEXIDINE GLUCONATE 500 MG/1
1 CLOTH TOPICAL EVERY 12 HOURS PRN
Status: DISCONTINUED | OUTPATIENT
Start: 2021-10-18 | End: 2021-10-18 | Stop reason: HOSPADM

## 2021-10-18 RX ORDER — ASPIRIN 325 MG
325 TABLET ORAL NIGHTLY
Status: DISCONTINUED | OUTPATIENT
Start: 2021-10-18 | End: 2021-10-18

## 2021-10-18 RX ORDER — FAMOTIDINE 20 MG/1
20 TABLET, FILM COATED ORAL ONCE
Status: COMPLETED | OUTPATIENT
Start: 2021-10-18 | End: 2021-10-18

## 2021-10-18 RX ORDER — MORPHINE SULFATE 2 MG/ML
2 INJECTION, SOLUTION INTRAMUSCULAR; INTRAVENOUS EVERY 4 HOURS PRN
Status: DISCONTINUED | OUTPATIENT
Start: 2021-10-18 | End: 2021-10-19 | Stop reason: HOSPADM

## 2021-10-18 RX ORDER — SODIUM CHLORIDE, SODIUM LACTATE, POTASSIUM CHLORIDE, CALCIUM CHLORIDE 600; 310; 30; 20 MG/100ML; MG/100ML; MG/100ML; MG/100ML
9 INJECTION, SOLUTION INTRAVENOUS CONTINUOUS
Status: DISCONTINUED | OUTPATIENT
Start: 2021-10-18 | End: 2021-10-19 | Stop reason: HOSPADM

## 2021-10-18 RX ORDER — LOSARTAN POTASSIUM 50 MG/1
100 TABLET ORAL DAILY
Status: DISCONTINUED | OUTPATIENT
Start: 2021-10-18 | End: 2021-10-19 | Stop reason: HOSPADM

## 2021-10-18 RX ORDER — ETOMIDATE 2 MG/ML
INJECTION INTRAVENOUS AS NEEDED
Status: DISCONTINUED | OUTPATIENT
Start: 2021-10-18 | End: 2021-10-18 | Stop reason: SURG

## 2021-10-18 RX ORDER — DEXAMETHASONE SODIUM PHOSPHATE 4 MG/ML
INJECTION, SOLUTION INTRA-ARTICULAR; INTRALESIONAL; INTRAMUSCULAR; INTRAVENOUS; SOFT TISSUE AS NEEDED
Status: DISCONTINUED | OUTPATIENT
Start: 2021-10-18 | End: 2021-10-18 | Stop reason: SURG

## 2021-10-18 RX ORDER — CHLORHEXIDINE GLUCONATE 500 MG/1
1 CLOTH TOPICAL EVERY 12 HOURS PRN
Status: DISCONTINUED | OUTPATIENT
Start: 2021-10-18 | End: 2021-10-19 | Stop reason: HOSPADM

## 2021-10-18 RX ORDER — SODIUM CHLORIDE 9 MG/ML
INJECTION, SOLUTION INTRAVENOUS AS NEEDED
Status: DISCONTINUED | OUTPATIENT
Start: 2021-10-18 | End: 2021-10-18 | Stop reason: HOSPADM

## 2021-10-18 RX ORDER — BISACODYL 10 MG
10 SUPPOSITORY, RECTAL RECTAL DAILY PRN
Status: DISCONTINUED | OUTPATIENT
Start: 2021-10-18 | End: 2021-10-19 | Stop reason: HOSPADM

## 2021-10-18 RX ORDER — ATORVASTATIN CALCIUM 20 MG/1
20 TABLET, FILM COATED ORAL DAILY
Status: DISCONTINUED | OUTPATIENT
Start: 2021-10-18 | End: 2021-10-19 | Stop reason: HOSPADM

## 2021-10-18 RX ORDER — HYDROCHLOROTHIAZIDE 25 MG/1
25 TABLET ORAL DAILY
Status: DISCONTINUED | OUTPATIENT
Start: 2021-10-18 | End: 2021-10-19 | Stop reason: HOSPADM

## 2021-10-18 RX ORDER — LABETALOL HYDROCHLORIDE 5 MG/ML
10 INJECTION, SOLUTION INTRAVENOUS
Status: DISCONTINUED | OUTPATIENT
Start: 2021-10-18 | End: 2021-10-19 | Stop reason: HOSPADM

## 2021-10-18 RX ORDER — ASPIRIN 81 MG/1
81 TABLET ORAL DAILY
Status: DISCONTINUED | OUTPATIENT
Start: 2021-10-18 | End: 2021-10-18 | Stop reason: SDUPTHER

## 2021-10-18 RX ORDER — SODIUM CHLORIDE 9 MG/ML
250 INJECTION, SOLUTION INTRAVENOUS ONCE AS NEEDED
Status: DISCONTINUED | OUTPATIENT
Start: 2021-10-18 | End: 2021-10-19 | Stop reason: HOSPADM

## 2021-10-18 RX ORDER — PROTAMINE SULFATE 10 MG/ML
INJECTION, SOLUTION INTRAVENOUS AS NEEDED
Status: DISCONTINUED | OUTPATIENT
Start: 2021-10-18 | End: 2021-10-18 | Stop reason: SURG

## 2021-10-18 RX ORDER — ACETAMINOPHEN 325 MG/1
650 TABLET ORAL EVERY 4 HOURS PRN
Status: DISCONTINUED | OUTPATIENT
Start: 2021-10-18 | End: 2021-10-18 | Stop reason: HOSPADM

## 2021-10-18 RX ORDER — HEPARIN SODIUM 1000 [USP'U]/ML
INJECTION, SOLUTION INTRAVENOUS; SUBCUTANEOUS AS NEEDED
Status: DISCONTINUED | OUTPATIENT
Start: 2021-10-18 | End: 2021-10-18 | Stop reason: SURG

## 2021-10-18 RX ORDER — ROCURONIUM BROMIDE 10 MG/ML
INJECTION, SOLUTION INTRAVENOUS AS NEEDED
Status: DISCONTINUED | OUTPATIENT
Start: 2021-10-18 | End: 2021-10-18 | Stop reason: SURG

## 2021-10-18 RX ORDER — CHLORHEXIDINE GLUCONATE 0.12 MG/ML
15 RINSE ORAL ONCE
Status: COMPLETED | OUTPATIENT
Start: 2021-10-18 | End: 2021-10-18

## 2021-10-18 RX ORDER — TERAZOSIN 5 MG/1
5 CAPSULE ORAL NIGHTLY
Status: DISCONTINUED | OUTPATIENT
Start: 2021-10-18 | End: 2021-10-19 | Stop reason: HOSPADM

## 2021-10-18 RX ORDER — ONDANSETRON 2 MG/ML
4 INJECTION INTRAMUSCULAR; INTRAVENOUS EVERY 6 HOURS PRN
Status: DISCONTINUED | OUTPATIENT
Start: 2021-10-18 | End: 2021-10-19 | Stop reason: HOSPADM

## 2021-10-18 RX ORDER — ONDANSETRON 4 MG/1
4 TABLET, FILM COATED ORAL EVERY 6 HOURS PRN
Status: DISCONTINUED | OUTPATIENT
Start: 2021-10-18 | End: 2021-10-19 | Stop reason: HOSPADM

## 2021-10-18 RX ORDER — HYDROCODONE BITARTRATE AND ACETAMINOPHEN 7.5; 325 MG/1; MG/1
1 TABLET ORAL EVERY 6 HOURS PRN
Status: DISCONTINUED | OUTPATIENT
Start: 2021-10-18 | End: 2021-10-19 | Stop reason: HOSPADM

## 2021-10-18 RX ORDER — SODIUM CHLORIDE 0.9 % (FLUSH) 0.9 %
10 SYRINGE (ML) INJECTION EVERY 12 HOURS SCHEDULED
Status: DISCONTINUED | OUTPATIENT
Start: 2021-10-18 | End: 2021-10-18 | Stop reason: HOSPADM

## 2021-10-18 RX ORDER — ACETAMINOPHEN 325 MG/1
650 TABLET ORAL EVERY 4 HOURS PRN
Status: DISCONTINUED | OUTPATIENT
Start: 2021-10-18 | End: 2021-10-19 | Stop reason: HOSPADM

## 2021-10-18 RX ORDER — ASPIRIN 81 MG/1
81 TABLET ORAL DAILY
Status: DISCONTINUED | OUTPATIENT
Start: 2021-10-19 | End: 2021-10-19 | Stop reason: HOSPADM

## 2021-10-18 RX ADMIN — HYDROCHLOROTHIAZIDE 25 MG: 25 TABLET ORAL at 11:40

## 2021-10-18 RX ADMIN — SODIUM CHLORIDE, POTASSIUM CHLORIDE, SODIUM LACTATE AND CALCIUM CHLORIDE 9 ML/HR: 600; 310; 30; 20 INJECTION, SOLUTION INTRAVENOUS at 07:30

## 2021-10-18 RX ADMIN — ACETAMINOPHEN 650 MG: 325 TABLET ORAL at 07:55

## 2021-10-18 RX ADMIN — LIDOCAINE HYDROCHLORIDE 0.5 ML: 10 INJECTION, SOLUTION EPIDURAL; INFILTRATION; INTRACAUDAL; PERINEURAL at 07:30

## 2021-10-18 RX ADMIN — HYDROCODONE BITARTRATE AND ACETAMINOPHEN 1 TABLET: 7.5; 325 TABLET ORAL at 13:50

## 2021-10-18 RX ADMIN — INSULIN LISPRO 5 UNITS: 100 INJECTION, SOLUTION INTRAVENOUS; SUBCUTANEOUS at 18:26

## 2021-10-18 RX ADMIN — ETOMIDATE 30 MG: 2 INJECTION, SOLUTION INTRAVENOUS at 09:29

## 2021-10-18 RX ADMIN — LABETALOL HYDROCHLORIDE 10 MG: 5 INJECTION, SOLUTION INTRAVENOUS at 10:09

## 2021-10-18 RX ADMIN — NICARDIPINE HYDROCHLORIDE 10 MG/HR: 0.1 INJECTION, SOLUTION INTRAVENOUS at 10:26

## 2021-10-18 RX ADMIN — TERAZOSIN HYDROCHLORIDE 5 MG: 5 CAPSULE ORAL at 21:06

## 2021-10-18 RX ADMIN — SODIUM CHLORIDE, POTASSIUM CHLORIDE, SODIUM LACTATE AND CALCIUM CHLORIDE: 600; 310; 30; 20 INJECTION, SOLUTION INTRAVENOUS at 09:22

## 2021-10-18 RX ADMIN — ROCURONIUM BROMIDE 50 MG: 10 INJECTION, SOLUTION INTRAVENOUS at 09:29

## 2021-10-18 RX ADMIN — SUGAMMADEX 200 MG: 100 INJECTION, SOLUTION INTRAVENOUS at 10:19

## 2021-10-18 RX ADMIN — MUPIROCIN 1 APPLICATION: 20 OINTMENT TOPICAL at 07:55

## 2021-10-18 RX ADMIN — SODIUM CHLORIDE 100 ML/HR: 9 INJECTION, SOLUTION INTRAVENOUS at 10:40

## 2021-10-18 RX ADMIN — CHLORHEXIDINE GLUCONATE 15 ML: 1.2 SOLUTION ORAL at 07:55

## 2021-10-18 RX ADMIN — SODIUM CHLORIDE 15 MG/HR: 9 INJECTION, SOLUTION INTRAVENOUS at 13:41

## 2021-10-18 RX ADMIN — SODIUM CHLORIDE: 9 INJECTION, SOLUTION INTRAVENOUS at 09:22

## 2021-10-18 RX ADMIN — DEXAMETHASONE SODIUM PHOSPHATE 8 MG: 4 INJECTION, SOLUTION INTRA-ARTICULAR; INTRALESIONAL; INTRAMUSCULAR; INTRAVENOUS; SOFT TISSUE at 09:34

## 2021-10-18 RX ADMIN — PROTAMINE SULFATE 100 MG: 10 INJECTION, SOLUTION INTRAVENOUS at 10:10

## 2021-10-18 RX ADMIN — SODIUM CHLORIDE 10 MG/HR: 9 INJECTION, SOLUTION INTRAVENOUS at 11:31

## 2021-10-18 RX ADMIN — FAMOTIDINE 20 MG: 20 TABLET ORAL at 07:55

## 2021-10-18 RX ADMIN — METOPROLOL TARTRATE 25 MG: 25 TABLET, FILM COATED ORAL at 21:06

## 2021-10-18 RX ADMIN — ONDANSETRON 4 MG: 2 INJECTION INTRAMUSCULAR; INTRAVENOUS at 09:35

## 2021-10-18 RX ADMIN — LOSARTAN POTASSIUM 100 MG: 50 TABLET, FILM COATED ORAL at 11:39

## 2021-10-18 RX ADMIN — LIDOCAINE HYDROCHLORIDE 50 MG: 10 INJECTION, SOLUTION EPIDURAL; INFILTRATION; INTRACAUDAL; PERINEURAL at 09:29

## 2021-10-18 RX ADMIN — INSULIN LISPRO 3 UNITS: 100 INJECTION, SOLUTION INTRAVENOUS; SUBCUTANEOUS at 11:39

## 2021-10-18 RX ADMIN — HEPARIN SODIUM 17000 UNITS: 1000 INJECTION, SOLUTION INTRAVENOUS; SUBCUTANEOUS at 09:50

## 2021-10-18 RX ADMIN — DOCUSATE SODIUM 50 MG AND SENNOSIDES 8.6 MG 2 TABLET: 8.6; 5 TABLET, FILM COATED ORAL at 21:06

## 2021-10-18 RX ADMIN — ATORVASTATIN CALCIUM 20 MG: 20 TABLET, FILM COATED ORAL at 11:40

## 2021-10-18 NOTE — NURSING NOTE
TAVR Multidisciplinary Team Meeting         Patient Name: Mitali Cruz     YOB: 1950     Referring Physician:  Kelli Singh MD  Admission Status: Outpatient    Attendees: José Antonio Bal MD, Joseph Major MD, Kelli Singh MD, Rohit Negrete MD, Shah Clinical Specialist, Fortunato Mckenzie, Mary BRAY and Reggie Thurston MD    Primary presentation of AS: Heart Failure and Pre-syncope   Heart Failure:  Chronic and Diastolic    NYHA Functional Class: III    LVEF:  55%   ANNULUS Measurement: 2.4 cm per CHRISTOPHER    Major Organ Compromise:   Renal (CKD stage 3a)    Procedure Specific Impediment:   N/A    Other Factors:  Major Nutritional Deficit: No  Cognitive Impairment: No  Oxygen dependent: No    STS Risk Score:  Mortality Risk: 1.9%  Mortality and Morbidity Risk: 13.0%    TAVR Rationale: Middle age, low risk gentleman with symptomatic severe aortic stenosis.  TAVR is chosen treatment plan per CT Surgery, Cardiology, and patient.         Diagnostic Studies Discussed/ Reviewed: CTA, Cath, CHRISTOPHER    Procedure planning details:  Adequate coronary artery heights.  Deployment angles Korean 4, Caudal 4  Valve Size: 26 mm Shah Lorenzo 3  Cardiology sheath access: left femoral  CT Surgery sheath access: right femoral    Post Procedure Considerations: Standard observation of post procedure jessica-arrhythmias and vascular access site bleeding.      Mary BRAY

## 2021-10-18 NOTE — OP NOTE
Operative Report    Preop Diagnosis: Symptomatic aortic valvular stenosis      Postoperative Diagnosis: Same      Procedure: Catheter in the left femoral artery and vein.  14 Montenegrin sheath in the right common femoral artery.  Multiple thoracic aortograms.  Right common femoral arteriogram.  Rapid ventricular pacing.  Aortic valve replacement with an Shah SUSHANT 3 transcatheter valve size 26.        Surgeons: Joseph Major MD      Assistant: José Antonio Bal MD    Cardiologist Yunior Singh and Penelope    The Assistant provided services of suctioning, irrigation and retraction.  Also, assisted in suture closure of parts of the skin incision.      Indication: Patient was referred for evaluation by the TAVR team for symptomatic aortic valvular stenosis.  We had determined he was a suitable candidate for this procedure.  The risk of the procedure including stroke bleeding infection death renal failure and limb loss were explained.  Also the potential need for permanent pacemaker following the procedure was explained.  These risks were explained a number of times by a number of different operators.  The patient agreed to proceed        Description: Supine position.  Sterile prep and drape.  Antibiotics given.  General endotracheal anesthesia.  The left femoral artery and vein were percutaneously cannulated by cardiology services for arteriography and rapid ventricular pacing respectively.  Their portion of the procedure will be dictated separately.  The right femoral artery was percutaneously cannulated and the patient heparinized.  2 Perclose devices were deployed and a 14 Montenegrin sheath was advanced over a stiff wire into the lower descending thoracic aorta.  We were able to cross the aortic valve with a stiff wire and this was changed over a guide catheter to a safari wire.  An Shah SUSHANT 3 valve size 26 was then advanced across the annulus and during a period of rapid ventricular pacing the valve was  deployed.  Following this transesophageal echo demonstrated normal prosthetic valvular function without evidence of significant paravalvular leak.  Aortogram demonstrated no evidence of aortic insufficiency.  The 14 Slovenian sheath was removed and the 2 Perclose devices were secured with good hemostasis.  An arteriogram of the right leg demonstrated no evidence of extravasation and no compromise of flow down the leg.  The total fluoroscopy time was 10 minutes and 48 seconds and the total contrast given 45 mL.      Please note that portions of this note were completed with a voice recognition program. Efforts were made to edit the dictations, but occasionally words are mistranscribed.

## 2021-10-18 NOTE — CASE MANAGEMENT/SOCIAL WORK
Discharge Planning Assessment  [facility]     Patient Name: Mitali Cruz  MRN: [id number]  Today's Date: 10/18/2021    Admit Date: 10/18/2021     Discharge Needs Assessment     Row Name 10/18/21 0906       Living Environment    Lives With alone    Current Living Arrangements home/apartment/condo    Primary Care Provided by self    Provides Primary Care For no one    Family Caregiver if Needed sibling(s)    Quality of Family Relationships helpful; involved; supportive    Able to Return to Prior Arrangements yes       Resource/Environmental Concerns    Resource/Environmental Concerns none       Transition Planning    Patient/Family Anticipates Transition to home    Patient/Family Anticipated Services at Transition none    Transportation Anticipated family or friend will provide       Discharge Needs Assessment    Readmission Within the Last 30 Days no previous admission in last 30 days    Equipment Currently Used at Home glucometer    Current Discharge Risk lives alone               Discharge Plan     Row Name 10/18/21 3822       Plan    Plan home    Patient/Family in Agreement with Plan yes    Plan Comments I saw Mr. Viavr at bedside.  He lives alone in [facility].  He has a sister who can check on him.  He is independent with ADL's.  No DME except for glucometer.  Has Rx coverage.  Stated he did have a POA but does not have it on file here.  His plan is home with sister to transport.    Final Discharge Disposition Code 01 - home or self-care              Continued Care and Services - Admitted Since 10/18/2021    Coordination has not been started for this encounter.          Demographic Summary     Row Name 10/18/21 2021       General Information    Admission Type inpatient    Reason for Consult discharge planning    Preferred Language English   PCP  - Nirmal Fuentes              Functional Status     Row Name 10/18/21 7953       Functional Status    Usual Activity Tolerance good    Current Activity Tolerance  good       Functional Status, IADL    Medications independent    Meal Preparation independent    Housekeeping independent    Laundry independent    Shopping independent               Psychosocial    No documentation.                Abuse/Neglect    No documentation.                Legal    No documentation.                Substance Abuse    No documentation.                Patient Forms    No documentation.                   Marielena Muñoz RN

## 2021-10-18 NOTE — ANESTHESIA POSTPROCEDURE EVALUATION
Patient: Mitali Cruz    Procedure Summary     Date: 10/18/21 Room / Location: [facility] OR 01 / [facility] HYBRID LAURA    Anesthesia Start: 0922 Anesthesia Stop:     Procedures:       TRANSCATHETER AORTIC VALVE REPLACEMENT (N/A Chest)      TRANSESOPHAGEAL ECHOCARDIOGRAM WITH ANESTHESIA (N/A Chest)      Transcatheter Aortic Valve Replacement (N/A ) Diagnosis:       Aortic stenosis, severe      (Aortic stenosis, severe [I35.0])    Surgeons: Joseph Major MD; Kelli Singh MD Provider: Cortney Goetz DO    Anesthesia Type: general ASA Status: 4          Anesthesia Type: general    Vitals  No vitals data found for the desired time range.          Post Anesthesia Care and Evaluation    Patient location during evaluation: PACU  Patient participation: complete - patient participated  Level of consciousness: awake and alert  Pain management: adequate  Airway patency: patent  Anesthetic complications: No anesthetic complications  PONV Status: none  Cardiovascular status: hemodynamically stable and acceptable  Respiratory status: nonlabored ventilation, acceptable and nasal cannula  Hydration status: acceptable

## 2021-10-18 NOTE — ANESTHESIA PROCEDURE NOTES
Airway  Urgency: elective    Date/Time: 10/18/2021 9:31 AM  Airway not difficult    General Information and Staff    Patient location during procedure: OR  CRNA: Graham Cheatham CRNA    Indications and Patient Condition  Indications for airway management: airway protection    Preoxygenated: yes  MILS not maintained throughout  Mask difficulty assessment: 1 - vent by mask    Final Airway Details  Final airway type: endotracheal airway      Successful airway: ETT  Cuffed: yes   Successful intubation technique: direct laryngoscopy  Endotracheal tube insertion site: oral  Blade: Singh  Blade size: 2  ETT size (mm): 7.5  Cormack-Lehane Classification: grade I - full view of glottis  Placement verified by: chest auscultation and capnometry   Measured from: lips  ETT/EBT  to lips (cm): 23  Number of attempts at approach: 1  Assessment: lips, teeth, and gum same as pre-op and atraumatic intubation    Additional Comments  Negative epigastric sounds, Breath sound equal bilaterally with symmetric chest rise and fall

## 2021-10-18 NOTE — OP NOTE
PROCEDURE(S):   1. 6F femoral arterial sheath placement.  2. 8F femoral venous sheath placement.  3. Temporary transvenous pacemaker insertion.  4. Catheter placement in the aortic root.  5. Multiple intraprocedural aortograms.   6. Balloon aortic valvuloplasty.  7. Transcatheter aortic valve replacement with 26 mm Shah SUSHANT 3 pericardial  prosthesis.     INDICATIONS:   1. Critical aortic stenosis.    INTERVENTIONAL CARDIOLOGISTS:  Kelli Singh MD.  Rohit Negrete MD.  MD corbin    CARDIAC SURGEONS:   MD José Antonio Alex MD.    DESCRIPTION OF PROCEDURE:   After informed consent, the patient was brought to the OR in a fasting condition. The patient was prepped and draped from level of chin to  knees by standard surgical technique.  Left femoral arterial access was obtained by percutaneous anterior wall puncture technique and an 6-Kyrgyz arterial sheath was placed.  Venous access was obtained in similar fashion and a 8-Kyrgyz venous sheath was placed.     A temporary transvenous pacing electrode was inserted via the left femoral venous access and advanced to the right ventricular apex and excellent pacing/sensing was noted. A 5F pigtail catheter was advanced from the femoral arterial access and this was advanced to the aortic root, and intraprocedural aortography was performed to confirm the implantation angle.    At this time, right femoral arterial access was obtained by Dr. Major and Dr. Bal and an Shah sheath was placed (see separate note). Using this access, AL1 catheter, and straight wire, the aortic valve was crossed. The catheter was advanced into the left ventricle and the wire was exchanged for a Safari wire. At this time a 26 mm tissue Shah SUSHANT 3 valve was advanced using standard delivery system. This was positioned under fluoroscopy. After the satisfactory position was confirmed, the valve was expanded under rapid pacing protocol. Satisfactory position was noted.  Post implant images revealed trivial aortic insufficiency which was paravalvular. No significant central regurgitation was noted.     At this time, the delivery system was removed. The arterial sheath was removed and the arteriotomy was closed by the surgeons (see separate note). The patient tolerated the procedure well and without complications.    FINAL IMPRESSION:   · Successful transcatheter aortic valve replacement with 26 mm        Shah SUSHANT 3 tissue valve.   · No acute procedure-related complications.     Kelli Singh MD, FACC

## 2021-10-18 NOTE — PROGRESS NOTES
Critical Care Note     LOS: 0 days   Patient Care Team:  Nirmal Fuentes MD as PCP - General (Family Medicine)  Kelli Singh MD as Consulting Physician (Cardiology)  Kory Maldonado MD as Consulting Physician (Urology)    Chief Complaint/Reason for visit:      Severe aortic valve stenosis  TAVR, October 18, 2021  Diabetes mellitus type 2  Chronic kidney disease  History of prostate cancer status post prostatectomy    Elmo Cruz is a 71 y.o. male admitted to [facility] on 10/18 for scheduled transaortic valve replacement by Dr. Major.     The patient has a history of HTN, dyslipidemia, T2DM, prostate CA s/p prostatectomy, CKD, and known prior mild AS. He was evaluated by a cardiology at [facility] placed on metoprolol, statin, and ASA wt cardiac clearance prior to his prostatectomy, but was lost to follow-up due to complications from the pandemic.     Over the past few weeks there have been complaints of ongoing lightheadedness, exertional dyspnea, and lower extremity swelling. He was evaluated by his PCP as TTE revealed moderate-severe aortic stenosis with a mean gradient 46, peak gradient 78, and JAMES 1.1cm2. The patient was referred to Dr. Singh with [facility] reiterating severe AS and mild coronary disease. He was sent to Dr. Major who recommended proceeding with TAVR and underwent pre-operative work-up. TAVR CTA significant for posterior LL fibronodular densities and central bronchiectatic changes.     COVID-19 testing on 10/15 negative. He has completed the Pfizer vaccination series and booster.     Interval History:     Patient underwent TAVR with a 26 mm Shah tissue valve with no immediate difficulties.  He arrived in the ICU in sinus rhythm.  He is on nasal cannula oxygen with a saturation of 95%.  He is drowsy postanesthesia.  He was hypertensive in surgery and comes to the ICU on Cardene at 10 mg/h.    Review of Systems:    All systems were reviewed and negative except as  "noted in subjective.    Medical history, surgical history, social history, family history reviewed    Objective     Intake/Output:    Intake/Output Summary (Last 24 hours) at 10/18/2021 1148  Last data filed at 10/18/2021 1010  Gross per 24 hour   Intake 600 ml   Output --   Net 600 ml       Nutrition:  Diet Regular; Consistent Carbohydrate, Cardiac    Infusions:  lactated ringers, 9 mL/hr, Last Rate: 9 mL/hr (10/18/21 0730)  niCARdipine, 5-15 mg/hr, Last Rate: 10 mg/hr (10/18/21 1131)  norepinephrine, 0.02-0.3 mcg/kg/min  phenylephrine, 0.5-3 mcg/kg/min  sodium chloride, 100 mL/hr, Last Rate: 100 mL/hr (10/18/21 1040)        Telemetry: Sinus rhythm             Vital Signs  Blood pressure 124/60, pulse 66, temperature 97 °F (36.1 °C), temperature source Temporal, resp. rate 16, height 185.4 cm (73\"), weight 112 kg (246 lb), SpO2 95 %.    Physical Exam:  General Appearance:   Well-developed older gentleman in no distress, softly snoring   Head:   Normocephalic, atraumatic   Eyes:          Pupils are small and equal, conjunctiva pink   Ears:     Throat:  Oral mucosa moist   Neck:  Trachea midline, no crepitus   Back:      Lungs:    Symmetric chest expansion.  Breath sounds are bilateral, equal, clear    Heart:   Regular rhythm, S1, S2 auscultated   Abdomen:    Nondistended, bowel sounds present, soft   Rectal:   Deferred   Extremities:  Right femoral puncture site without hematoma or bleeding.  Left femoral sheath in place.  Right radial arterial line in place.  Right hand is pink and viable.   Pulses:    Skin:  Warm and dry   Lymph nodes:  No cervical adenopathy   Neurologic:  Drowsy, arouses to gentle stimulation and then drifts back to sleep immediately      Results Review:     I reviewed the patient's new clinical results.   Results from last 7 days   Lab Units 10/15/21  1024   SODIUM mmol/L 138   POTASSIUM mmol/L 4.1   CHLORIDE mmol/L 103   CO2 mmol/L 22.0   BUN mg/dL 35*   CREATININE mg/dL 1.56*   CALCIUM mg/dL " 9.7   BILIRUBIN mg/dL 0.3   ALK PHOS U/L 52   ALT (SGPT) U/L 25   AST (SGOT) U/L 27   GLUCOSE mg/dL 187*     Results from last 7 days   Lab Units 10/18/21  0956 10/15/21  1024   WBC 10*3/mm3  --  6.70   HEMOGLOBIN g/dL  --  13.8   HEMOGLOBIN, POC g/dL 12.2  --    HEMATOCRIT %  --  42.2   HEMATOCRIT POC % 36*  --    PLATELETS 10*3/mm3  --  191     Results from last 7 days   Lab Units 10/18/21  0956   PH, ARTERIAL pH units 7.40     No results found for: BLOODCX  No results found for: URINECX    I reviewed the patient's new imaging including images and reports.    Echocardiogram Comments:       Abbreviated CHRISTOPHER for TAVR  Dr Major, Penelope Singh Rogers Long discussion preop of risks of CHRISTOPHER, questions answered, agreeable to proceed.  CHRISTOPHER by A Green.   Probe passed with ease.     Baseline:  LVH, LVEF 60% with no obvious WMA.  No PFO, nor FLORENCIA clot.  RV function preserved.  Severe AS with turbulent flow noted beyond valve, blunting of STJ; difficulty aligning for gradients, however Vmax 3.97 m/s, max gradient 63 mean 36 mmhg; DI 0.22, annulus/leaflets calcified. No AI.  Mild TR, MR, PI.  Trace baseline pericardial effusion.  No dissections in visualized aorta.       Post:  S/p TAVR 26 mm valve;  LV and RV function preserved.  Valve appears well seated, no rocking motion, leaflets opening well.  Tiny PVL below AMVL - reviewed with team ; gradients 13 max 6mmhg mean.  No worsening effusion.  Visualized aorta portions intact.  All findings shared with TAVR team.   Probe removed without blood on tip.     Preoperative FEV1 3.21 L, 88%    All medications reviewed.   [START ON 10/19/2021] aspirin, 81 mg, Oral, Daily  atorvastatin, 20 mg, Oral, Daily  hydroCHLOROthiazide, 25 mg, Oral, Daily  insulin lispro, 0-14 Units, Subcutaneous, TID AC  losartan, 100 mg, Oral, Daily  metoprolol tartrate, 25 mg, Oral, Nightly  polyethylene glycol, 17 g, Oral, Daily  senna-docusate sodium, 2 tablet, Oral, Nightly  terazosin, 5 mg,  Oral, Nightly          Assessment/Plan       Severe AS     Hypertension    CKD (baseline sCr ~1.5)     T2DM on insulin     Dyslipidemia    H/O prostatectomy    Class 1 obesity in adult    71-year-old gentleman, non-smoker with progressive shortness of air and dizziness found to have severe aortic valve stenosis.  Today he underwent TAVR with no immediate difficulties.  He does have underlying diabetes mellitus type 2 and chronic kidney disease with a baseline creatinine of 1.5, GFR 44.  His A1c is 6.7 revealing adequately controlled diabetes.  He was taking Ozempic, Tresiba and NovoLog.  Preoperative hemoglobin was 13.8.  Postoperative labs are pending.  He was very hypertensive in the preoperative holding area.  He comes out of the OR on a Cardene drip.  He takes hydrochlorothiazide, Cozaar, prazosin, metoprolol as an outpatient.    PLAN:    Continue Cardene drip until he is more alert then restart Cozaar, hydrochlorothiazide.  Hytrin is substituted for Minipress.  Sometimes TAVR's can become bradycardic so I may hold his beta-blocker as needed , will see what his heart rate is tonight    Initiate sliding scale insulin coverage, resume long-acting once he is taking p.o.    Aspirin, statin    Monitor for signs of urinary retention with previous prostatectomy for cancer      Electronically signed by KATARINA Del Toro, 10/18/21, 8:36 AM EDT.    KATARINA collected historical data, I evaluated the patient at the bedside, performed a physical examination, reviewed his lab and x-ray data, dictated the assessment and plan and extensively modified the above note to reflect my additions and findings    Rose Gole MD        Time: Critical care 35 min  I personally provided care to this critically ill patient as documented above.  Critical care time does not include time spent on separately billed procedures.  None of my critical care time was concurrent with other critical care providers.

## 2021-10-18 NOTE — H&P
Pre-Op H&P  Mitali Cruz  [id number]  1950      Chief complaint: Shortness of breath      Subjective:  Patient is a 71 y.o.male presents for scheduled surgery by Dr. Major. He anticipates a TRANSCATHETER AORTIC VALVE REPLACEMENT; TRANSESOPHAGEAL ECHOCARDIOGRAM WITH ANESTHESIA today. He has shortness of breath and fatigue with minimal exertion. He denies chest pains. He has had a precordial echocardiogram demonstrating a peak gradient across his valve of 78 mmHg with a velocity of 442.  Heart cath 8/13/21 showing severe aortic disease.       Review of Systems:  Constitutional-- No fever, chills or sweats. + fatigue.  CV-- No chest pain, palpitation or syncope. +HTN, HLD  Resp-- No SOB, cough, hemoptysis  Skin--No rashes or lesions      Allergies:   Allergies   Allergen Reactions   • Lisinopril Cough   • Metformin GI Intolerance   • Nifedipine Swelling   • Singulair [Montelukast] Hives         Home Meds:  Medications Prior to Admission   Medication Sig Dispense Refill Last Dose   • aspirin 81 MG EC tablet Take 81 mg by mouth Daily.   10/17/2021 at 2130   • Atorvastatin Calcium (LIPITOR PO) 20 mg Daily.   10/17/2021 at 0830   • fenofibrate 160 MG tablet Take 160 mg by mouth Daily.   10/17/2021 at 1230   • guaiFENesin 200 MG tablet Take 400 mg by mouth Every 4 (Four) Hours As Needed for Cough.   10/17/2021 at 1800   • hydroCHLOROthiazide (HYDRODIURIL) 25 MG tablet Take 25 mg by mouth Daily.   10/17/2021 at 0830   • Insulin Aspart (NOVOLOG FLEXPEN SC) 30 Units. Sliding   10/17/2021 at 1830   • Insulin Degludec (TRESIBA FLEXTOUCH SC) 60 Units.   10/17/2021 at 0830   • losartan (COZAAR) 100 MG tablet Take 100 mg by mouth Daily.   10/17/2021 at 0830   • metoprolol tartrate (LOPRESSOR) 25 MG tablet Take 25 mg by mouth Daily.   10/17/2021 at 2000   • prazosin (MINIPRESS) 5 MG capsule 5 mg Daily.   10/17/2021 at 2000   • Ozempic, 0.25 or 0.5 MG/DOSE, 2 MG/1.5ML solution pen-injector 0.25 mg 1 (One) Time Per Week.    This is a 64 year old  gentleman.       Chief Complaint   Patient presents with   • Hand     f/u. Patient wants injection into both hands.       INTERVAL HISTORY  The patient returns on 4/15/2019 urgently for severe pain in MCPs with swelling, reflecting pseudogout attack. Status post intra-articular cortisone injections with good outcome. Last injections were given 4/27/2018.    Symptoms have been progressively worse for the past 4 months. Specifically, he points to bilateral second and third MCPs. The pain has been constant for the past 1-2 months, in tolerable.  The pain is worse upon activity or movement.  The pain is throbbing and shooting in nature. He says his pain is 7 /10 in general and 10/10 on movement, 10/10 today.  The pain is aggravated by bending, weather/temperature changes and movement.  The pain is alleviated by rest. He does have joint swelling, warmth but no erythema. He does  experience morning stiffness. It lasts severe hours to all day long.  He has difficulty with using hands to grasp small objects, dressing self, working.  He uses no assistive device. He does  experience fatigue. He states the fatigue is moderate. Denies fevers, chills or night sweats. His weight has been decreasing.     Other complaints include ringing in ears, dryness in nose, high blood pressure, alopecia, anxiety/depression.      PROBLEM LIST:   1. 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     Normal sed rate and CRP. Negative rheumatoid factor and anti-CCP. No serologic evidence of rheumatoid arthritis.    X-rays showed osteoarthritic changes of left 3rd MCP without bony erosion.    No response to Tylenol and NSAIDs.     2. Bilateral hip pain due to osteoarthritis.     3. History of gout, based upon podagra and crystal proven.   Most recent uric acid was 4.3 mg/dL on 01/27/2012.   The patient is not currently on  "10/13/2021         PMH:   Past Medical History:   Diagnosis Date   • Cancer (HCC)     prostate   • Carpal tunnel syndrome of left wrist    • Diabetes mellitus (HCC)    • History of echocardiogram    • History of stress test    • Hyperlipidemia    • Hypertension    • Kidney disorder    • Kidney stones    • Mitral valve disorder    • Tinnitus    • Wears glasses      PSH:    Past Surgical History:   Procedure Laterality Date   • APPENDECTOMY     • CARDIAC CATHETERIZATION N/A 8/13/2021    Procedure: LEFT HEART CATH;  Surgeon: Kelli Singh MD;  Location: [facility] CATH INVASIVE LOCATION;  Service: Cardiology;  Laterality: N/A;   • CARPAL TUNNEL RELEASE      right   • PROSTATECTOMY     • SINUS SURGERY     • TONSILLECTOMY     • TRIGGER FINGER RELEASE         Immunization History:  Influenza: No  Pneumococcal: UTD  Tetanus: No  Covid x3: 2021    Social History:   Tobacco:   Social History     Tobacco Use   Smoking Status Never Smoker   Smokeless Tobacco Never Used      Alcohol:     Social History     Substance and Sexual Activity   Alcohol Use Not Currently         Physical Exam:/79 (BP Location: Right arm, Patient Position: Lying)   Pulse 57   Temp 97 °F (36.1 °C) (Temporal)   Resp 18   Ht 185.4 cm (73\")   Wt 112 kg (246 lb)   SpO2 97%   BMI 32.46 kg/m²       General Appearance:    Alert, cooperative, no distress, appears stated age   Head:    Normocephalic, without obvious abnormality, atraumatic   Lungs:     Clear to auscultation bilaterally, respirations unlabored    Heart:   Regular rate and rhythm, S1 and S2 normal    Abdomen:    Soft without tenderness   Extremities:   Extremities normal, atraumatic, no cyanosis or edema   Skin:   Skin color, texture, turgor normal, no rashes or lesions   Neurologic:   Grossly intact     Results Review:     LABS:  Lab Results   Component Value Date    WBC 6.70 10/15/2021    HGB 13.8 10/15/2021    HCT 42.2 10/15/2021    MCV 82.9 10/15/2021     10/15/2021    " urate lowering therapy.     4. Osteoarthritis of knees, status post right knee arthroscopy and Synvisc injections.     5. Dupuytren contracture in the left ring finger.     6. Other medical problems include:  Patient Active Problem List   Diagnosis   • Hand swelling   • Calcium pyrophosphate crystal arthritis   • Osteoarthritis   • DM (diabetes mellitus), type 2 with complications (CMS/HCC)   • Hyperlipidemia LDL goal <100   • Benign essential hypertension   • Major depressive disorder, recurrent episode, moderate (CMS/HCC)   • Situational anxiety   • Diabetic peripheral neuropathy (CMS/HCC)   • Microalbuminuria   • DM (diabetes mellitus), type 2 with neurological complications (CMS/HCC)   • DM (diabetes mellitus), type 2 with renal complications (CMS/HCC)   • Obesity   • Coronary artery disease involving native coronary artery of native heart without angina pectoris   • Pseudogout   • Generalized osteoarthritis   • Acute ischemic right MCA stroke (CMS/HCC)   • Obesity due to excess calories   • Sleep-disordered breathing       PAST SURGICAL HISTORY:   Right knee scope in 2007 for meniscus repair.   Angioplasty with stent placement in 1996.     ALLERGIES:   ALLERGIES:  No Known Allergies      Current Outpatient Medications   Medication Sig Dispense Refill   • sulfamethoxazole-trimethoprim (BACTRIM DS) 800-160 MG per tablet Take 1 tablet by mouth 2 times daily. 20 tablet 0   • aspirin 81 MG chewable tablet Chew 1 tablet by mouth daily. 30 tablet 11   • atorvastatin (LIPITOR) 80 MG tablet Take 1 tablet by mouth nightly. 30 tablet 1   • clopidogrel (PLAVIX) 75 MG tablet Take 1 tablet by mouth daily. 30 tablet 2   • cyclobenzaprine (FLEXERIL) 10 MG tablet Take 1 tablet by mouth 3 times daily as needed for Muscle spasms. 30 tablet 0   • losartan (COZAAR) 100 MG tablet Take 1 tablet by mouth daily. 30 tablet 1   • metoPROLOL (TOPROL-XL) 25 MG 24 hr tablet Take 1 tablet by mouth daily. 30 tablet 1   • insulin glargine  (LANTUS SOLOSTAR) 100 UNIT/ML pen-injector Inject 30 Units into the skin nightly. 15 mL 1   • Insulin Pen Needle 31G X 8 MM Misc Use with Insulin Pen 100 each 0   • glipiZIDE (GLUCOTROL) 5 MG tablet Take 5 mg by mouth daily (before breakfast).     • metformin (GLUCOPHAGE-XR) 500 MG 24 hr tablet Take 500 mg by mouth 2 times daily.     • sertraline (ZOLOFT) 100 MG tablet Take 150 mg by mouth daily.      • Omega-3 Fatty Acids (FISH OIL) 1000 MG capsule Take 2 g by mouth daily.     • Cinnamon 500 MG Cap Take 2 capsules by mouth daily.     • busPIRone (BUSPAR) 7.5 MG tablet Takes 1 tablet twice daily only as needed for anxiety 40 tablet 1     No current facility-administered medications for this visit.        FAMILY AND SOCIAL HISTORY:   Unchanged. Please see my previous note dated 01/27/2012 for more details.       REVIEW OF SYSTEMS:   As mentioned above. I reviewed the form filled out by the patient.       PHYSICAL EXAMINATION:   GENERAL: Alert, awake, oriented x 3, not in acute distress. Pleasant and appears his stated age  VITAL SIGNS: Blood pressure 150/78, pulse 72, resp. rate 18, height 5' 10\" (1.778 m), weight 113.4 kg.  SKIN: Notable for dry skin.   No palpable purpura, psoriasis or livedo on the upper and lower extremities.   HEENT: Pink palpebral conjunctivae, anicteric sclerae, pupils reactive to light. No malar rash, oronasal ulcerations or parotid gland enlargement with moist buccal mucosa. No heliotrope rash. No scalp tenderness.   NECK: Supple, no carotid bruit, thyromegaly or lymphadenopathy with no palpable mass.   LUNGS: No rales or wheezing.   HEART: No gallop, significant murmur or friction rub.   ABDOMEN: Soft, nontender, positive bowel sounds. No hepatosplenomegaly.   EXTREMITIES: No edema, cyanosis or clubbing. No sclerodactyly or periungual erythema. Warm and well perfused.  MUSCULOSKELETAL:   Notable for severe synovitis in bilateral second and third MCPs with limited flexion and severe pain on  NEUTROABS 4.82 10/15/2021    GLUCOSE 187 (H) 10/15/2021    BUN 35 (H) 10/15/2021    CREATININE 1.56 (H) 10/15/2021    EGFRIFNONA 44 (L) 10/15/2021     10/15/2021    K 4.1 10/15/2021     10/15/2021    CO2 22.0 10/15/2021    MG 2.2 10/15/2021    CALCIUM 9.7 10/15/2021    ALBUMIN 4.50 10/15/2021    AST 27 10/15/2021    ALT 25 10/15/2021    BILITOT 0.3 10/15/2021       RADIOLOGY:  8/16/21 CT angio chest:  IMPRESSION:  Fibronodular densities posteriorly within the lower lung  fields and lung bases concerning for possible infiltrate or chronic  change. Bronchiectatic changes centrally. Calcifications also seen  throughout the aortic valve. There is diverticulosis of the colon with  no evidence of diverticulitis. Stool seen throughout the colon  suggesting clinical presentation of constipation.    8/13/21 heart cath:  FINAL IMPRESSION:  · Minor coronary artery disease  · Severe aortic stenosis     RECOMMENDATIONS:  · May proceed with TAVR    8/13/21 carotids:  Interpretation Summary    · Mild heterogeneous carotid atherosclerosis bilaterally.  · Right internal carotid artery stenosis of 0-49%.  · Left internal carotid artery stenosis of 0-49%      I reviewed the patient's new clinical results.    Cancer Staging (if applicable)  Cancer Patient: __ yes __no __unknown; If yes, clinical stage T:__ N:__M:__, stage group or __N/A      Impression: Severe aortic stenosis      Plan: TRANSCATHETER AORTIC VALVE REPLACEMENT; TRANSESOPHAGEAL ECHOCARDIOGRAM WITH ANESTHESIA  Agreed the above.  Plans for transcatheter aortic valve replacement today.  Patient been well apprised that this procedure has risk of stroke bleeding infection and death.  Absolutely no guarantees were made as to outcome and he agrees to proceed.  Also aware of the possible need for permanent pacemaker placement following this procedure.  These risks have been explained by me on 2 separate occasions by other members of the TAVR team also the patient  agrees to proceed    KATARINA Vivas   10/18/2021   07:38 EDT   motion, worse on right side.   Grade 1 Dupuytren contracture in left 4th finger.     Aside from osteoarthritic changes, I did not appreciate synovitis in other peripheral joints.       Lab Results   Component Value Date    WBC 15.8 (H) 09/30/2017    HCT 40.1 09/30/2017    HGB 13.5 09/30/2017     09/30/2017     ESR (mm/hr)   Date Value   01/27/2012 8     C-REACTIVE PROTEIN (mg/dL)   Date Value   01/27/2012 <0.3     URIC ACID (mg/dL)   Date Value   01/27/2012 4.3     Lab Results   Component Value Date    SODIUM 139 09/30/2017    POTASSIUM 4.5 09/30/2017    CHLORIDE 104 09/30/2017    CO2 28 09/30/2017    CREATININE 0.93 09/30/2017    TOTPROTEIN 7.1 01/31/2013    ALBUMIN 4.0 01/31/2013    CALCIUM 8.8 09/30/2017    BILIRUBIN 0.9 01/31/2013    ALKPT 55 01/31/2013    GPT 64 03/21/2014    BCRAT 20 09/30/2017    GLOB 3.4 01/27/2012    GFRNA 88 09/30/2017    GFRA >90 09/30/2017       ASSESSMENT AND PLAN:   1. Recurrent Inflammatory arthritis affecting bilateral second and third MCPs, history of CPPD and pseudogout crystal proven, consistent with CPPD with pseudogout and pseudo-osteoarthritis.    I would agree with intra-articular cortisone injections.    2. Generalized osteoarthritis.  Take acetaminophen as needed.     3. No evidence of metabolic or endocrine etiology for his CPPD.     4. Dupuytren's contracture, observe at this point.    PROCEDURE NOTE:   Bilateral second and third MCP Cortisone injections.     Indications, benefits and risks were explained to the patient in detail. The patient provided verbal consent for the injections. Local skin was prepped in a sterile fashion. After local skin anesthesia with Ethyl Chloride spray, 10 mg of Kenalog were injected into each joint as mentioned above. The patient didn't tolerate the injections well with severe pain. Injection aftercare handout was given.      I will see the patient back as needed for followup. The patient was advised to call should any questions or  concerns arise in the future.

## 2021-10-18 NOTE — ANESTHESIA PROCEDURE NOTES
Arterial Line      Patient location during procedure: pre-op   Performed By   Anesthesiologist: Cortney Goetz DO  Preanesthetic Checklist  Completed: patient identified, IV checked, site marked, risks and benefits discussed, surgical consent, monitors and equipment checked, pre-op evaluation and timeout performed  Arterial Line Prep   Sterile Tech: cap, gloves and sterile barriers  Prep: ChloraPrep  Patient monitoring: EKG, continuous pulse oximetry and blood pressure monitoring  Arterial Line Procedure   Laterality:right  Location:  radial artery  Catheter size: 20 G   Guidance: ultrasound guided and palpation technique  Number of attempts: 1  Post Assessment   Dressing Type: wrist guard applied, secured with tape and occlusive dressing applied.   Complications no  Circ/Move/Sens Assessment: normal and unchanged.   Patient Tolerance: patient tolerated the procedure well with no apparent complications

## 2021-10-18 NOTE — PLAN OF CARE
Goal Outcome Evaluation:         Neuro- alert and oriented x4    Resp- unlabored on 2L NC, lung sounds are clear, using incentive spirometer independently     Cardio- systolic less than 120 with no vasoactive drips, sheaths discontinued no sign of bleeding or hematoma   Pulses are strong and equal    GI- abdomen is soft non tender, tolerating diet    - adequate  ml total

## 2021-10-18 NOTE — ANESTHESIA PROCEDURE NOTES
Procedure Performed: Emergent/Open-Heart Anesthesia CHRISTOPHER     Start Time:        End Time:      Preanesthesia Checklist:  Patient identified, IV assessed, risks and benefits discussed, monitors and equipment assessed, procedure being performed at surgeon's request and anesthesia consent obtained.    General Procedure Information  CHRISTOPHER Placed for monitoring purposes only -- This is not a diagnostic CHRISTOPHER  Physician Requesting Echo: Joseph Major MD  Location performed:  OR  Intubated  Bite block placed  Heart visualized  Probe Insertion:  Easy  Probe Type:  Multiplane  Modalities:  2D only, color flow mapping, continuous wave Doppler and pulse wave Doppler        Anesthesia Information  Performed Personally      Echocardiogram Comments:       Abbreviated CHRISTOPHER for TAVR  Francisco Gastelum Aslam, Rogers Long discussion preop of risks of CHRISTOPHER, questions answered, agreeable to proceed.  CHRISTOPHER by A Green.   Probe passed with ease.    Baseline:  LVH, LVEF 60% with no obvious WMA.  No PFO, nor FLORENCIA clot.  RV function preserved.  Severe AS with turbulent flow noted beyond valve, blunting of STJ; difficulty aligning for gradients, however Vmax 3.97 m/s, max gradient 63 mean 36 mmhg; DI 0.22, annulus/leaflets calcified. No AI.  Mild TR, MR, PI.  Trace baseline pericardial effusion.  No dissections in visualized aorta.      Post:  S/p TAVR 26 mm valve;  LV and RV function preserved.  Valve appears well seated, no rocking motion, leaflets opening well.  Tiny PVL below AMVL - reviewed with team ; gradients 13 max 6mmhg mean.  No worsening effusion.  Visualized aorta portions intact.  All findings shared with TAVR team.   Probe removed without blood on tip.

## 2021-10-19 ENCOUNTER — TRANSCRIBE ORDERS (OUTPATIENT)
Dept: CARDIAC REHAB | Facility: HOSPITAL | Age: 71
End: 2021-10-19

## 2021-10-19 VITALS
OXYGEN SATURATION: 97 % | RESPIRATION RATE: 18 BRPM | HEIGHT: 73 IN | HEART RATE: 60 BPM | DIASTOLIC BLOOD PRESSURE: 58 MMHG | BODY MASS INDEX: 32.6 KG/M2 | TEMPERATURE: 97.7 F | SYSTOLIC BLOOD PRESSURE: 128 MMHG | WEIGHT: 246 LBS

## 2021-10-19 DIAGNOSIS — Z95.2 S/P AVR: Primary | ICD-10-CM

## 2021-10-19 LAB
ANION GAP SERPL CALCULATED.3IONS-SCNC: 11 MMOL/L (ref 5–15)
ANION GAP SERPL CALCULATED.3IONS-SCNC: 11 MMOL/L (ref 5–15)
BH BB BLOOD EXPIRATION DATE: NORMAL
BH BB BLOOD EXPIRATION DATE: NORMAL
BH BB BLOOD TYPE BARCODE: 6200
BH BB BLOOD TYPE BARCODE: 6200
BH BB DISPENSE STATUS: NORMAL
BH BB DISPENSE STATUS: NORMAL
BH BB PRODUCT CODE: NORMAL
BH BB PRODUCT CODE: NORMAL
BH BB UNIT NUMBER: NORMAL
BH BB UNIT NUMBER: NORMAL
BUN SERPL-MCNC: 28 MG/DL (ref 8–23)
BUN SERPL-MCNC: 31 MG/DL (ref 8–23)
BUN/CREAT SERPL: 17.6 (ref 7–25)
BUN/CREAT SERPL: 19.9 (ref 7–25)
CALCIUM SPEC-SCNC: 8.3 MG/DL (ref 8.6–10.5)
CALCIUM SPEC-SCNC: 8.3 MG/DL (ref 8.6–10.5)
CHLORIDE SERPL-SCNC: 103 MMOL/L (ref 98–107)
CHLORIDE SERPL-SCNC: 104 MMOL/L (ref 98–107)
CO2 SERPL-SCNC: 21 MMOL/L (ref 22–29)
CO2 SERPL-SCNC: 22 MMOL/L (ref 22–29)
CREAT SERPL-MCNC: 1.56 MG/DL (ref 0.76–1.27)
CREAT SERPL-MCNC: 1.59 MG/DL (ref 0.76–1.27)
CROSSMATCH INTERPRETATION: NORMAL
CROSSMATCH INTERPRETATION: NORMAL
DEPRECATED RDW RBC AUTO: 43.8 FL (ref 37–54)
DEPRECATED RDW RBC AUTO: 45 FL (ref 37–54)
ERYTHROCYTE [DISTWIDTH] IN BLOOD BY AUTOMATED COUNT: 15 % (ref 12.3–15.4)
ERYTHROCYTE [DISTWIDTH] IN BLOOD BY AUTOMATED COUNT: 15 % (ref 12.3–15.4)
GFR SERPL CREATININE-BSD FRML MDRD: 43 ML/MIN/1.73
GFR SERPL CREATININE-BSD FRML MDRD: 44 ML/MIN/1.73
GLUCOSE BLDC GLUCOMTR-MCNC: 173 MG/DL (ref 70–130)
GLUCOSE SERPL-MCNC: 165 MG/DL (ref 65–99)
GLUCOSE SERPL-MCNC: 200 MG/DL (ref 65–99)
HCT VFR BLD AUTO: 35.1 % (ref 37.5–51)
HCT VFR BLD AUTO: 36 % (ref 37.5–51)
HGB BLD-MCNC: 11.8 G/DL (ref 13–17.7)
HGB BLD-MCNC: 12 G/DL (ref 13–17.7)
MCH RBC QN AUTO: 27.2 PG (ref 26.6–33)
MCH RBC QN AUTO: 27.3 PG (ref 26.6–33)
MCHC RBC AUTO-ENTMCNC: 33.3 G/DL (ref 31.5–35.7)
MCHC RBC AUTO-ENTMCNC: 33.6 G/DL (ref 31.5–35.7)
MCV RBC AUTO: 80.9 FL (ref 79–97)
MCV RBC AUTO: 82 FL (ref 79–97)
PLATELET # BLD AUTO: 174 10*3/MM3 (ref 140–450)
PLATELET # BLD AUTO: 180 10*3/MM3 (ref 140–450)
PMV BLD AUTO: 10.6 FL (ref 6–12)
PMV BLD AUTO: 9.9 FL (ref 6–12)
POTASSIUM SERPL-SCNC: 4.1 MMOL/L (ref 3.5–5.2)
POTASSIUM SERPL-SCNC: 4.2 MMOL/L (ref 3.5–5.2)
QT INTERVAL: 446 MS
QTC INTERVAL: 441 MS
RBC # BLD AUTO: 4.34 10*6/MM3 (ref 4.14–5.8)
RBC # BLD AUTO: 4.39 10*6/MM3 (ref 4.14–5.8)
SODIUM SERPL-SCNC: 136 MMOL/L (ref 136–145)
SODIUM SERPL-SCNC: 136 MMOL/L (ref 136–145)
UNIT  ABO: NORMAL
UNIT  ABO: NORMAL
UNIT  RH: NORMAL
UNIT  RH: NORMAL
WBC # BLD AUTO: 11.75 10*3/MM3 (ref 3.4–10.8)
WBC # BLD AUTO: 12.04 10*3/MM3 (ref 3.4–10.8)

## 2021-10-19 PROCEDURE — 99239 HOSP IP/OBS DSCHRG MGMT >30: CPT | Performed by: NURSE PRACTITIONER

## 2021-10-19 PROCEDURE — 80048 BASIC METABOLIC PNL TOTAL CA: CPT | Performed by: PHYSICIAN ASSISTANT

## 2021-10-19 PROCEDURE — 85027 COMPLETE CBC AUTOMATED: CPT | Performed by: PHYSICIAN ASSISTANT

## 2021-10-19 PROCEDURE — 99232 SBSQ HOSP IP/OBS MODERATE 35: CPT | Performed by: INTERNAL MEDICINE

## 2021-10-19 PROCEDURE — 99232 SBSQ HOSP IP/OBS MODERATE 35: CPT | Performed by: THORACIC SURGERY (CARDIOTHORACIC VASCULAR SURGERY)

## 2021-10-19 PROCEDURE — 82962 GLUCOSE BLOOD TEST: CPT

## 2021-10-19 PROCEDURE — 93005 ELECTROCARDIOGRAM TRACING: CPT | Performed by: PHYSICIAN ASSISTANT

## 2021-10-19 PROCEDURE — 63710000001 INSULIN LISPRO (HUMAN) PER 5 UNITS: Performed by: INTERNAL MEDICINE

## 2021-10-19 RX ADMIN — ATORVASTATIN CALCIUM 20 MG: 20 TABLET, FILM COATED ORAL at 08:26

## 2021-10-19 RX ADMIN — ASPIRIN 81 MG: 81 TABLET, COATED ORAL at 08:26

## 2021-10-19 RX ADMIN — INSULIN LISPRO 3 UNITS: 100 INJECTION, SOLUTION INTRAVENOUS; SUBCUTANEOUS at 08:27

## 2021-10-19 RX ADMIN — LOSARTAN POTASSIUM 100 MG: 50 TABLET, FILM COATED ORAL at 08:26

## 2021-10-19 RX ADMIN — HYDROCHLOROTHIAZIDE 25 MG: 25 TABLET ORAL at 08:25

## 2021-10-19 NOTE — CASE MANAGEMENT/SOCIAL WORK
Continued Stay Note  [facility]     Patient Name: Mitali Cruz  MRN: [id number]  Today's Date: 10/19/2021    Admit Date: 10/18/2021     Discharge Plan     Row Name 10/19/21 0951       Plan    Plan Home    Patient/Family in Agreement with Plan yes    Plan Comments Mr Cruz is being DC today. Called his room, no answer. Called and spoke with his sister, Maricel. She will be providing transportation home today. She didn't express any anticipated DC needs.    Final Discharge Disposition Code 01 - home or self-care               Discharge Codes    No documentation.               Expected Discharge Date and Time     Expected Discharge Date Expected Discharge Time    Oct 19, 2021             Leslie Flowers RN

## 2021-10-19 NOTE — NURSING NOTE
Pt. Referred for Phase II Cardiac Rehab. Staff discussed benefits of exercise, program protocol, and educational material provided. Teach back verified.  Patient scheduled for orientation at [facility] on 11/23/21 at 9:00am

## 2021-10-19 NOTE — PLAN OF CARE
Goal Outcome Evaluation:  Plan of Care Reviewed With: patient        Progress: improving       Neuro intact, ambu x 220ft with minimal assist once bedrest complete. VSS on 2-3L NC, no gtts. SB-SR. HR 50-60s. -120. Bilateral groin sites remain soft, no drainage noted. Bilateral pedal pulses palpable. +BS, no BM. UOP 700mL.  Pt denies pain when asked, no PRNs given.

## 2021-10-19 NOTE — PROGRESS NOTES
"[facility] Cardiology Daily Note       LOS: 1 day   Patient Care Team:  Nirmal Fuentes MD as PCP - General (Family Medicine)  Kelli Singh MD as Consulting Physician (Cardiology)  Kory Maldonado MD as Consulting Physician (Urology)    Chief Complaint: Postop day 1 status post TAVR    Subjective     Subjective: Has done very well overnight.  No issues with his groins.  Complains of fatigue on metoprolol and also thinks that his prazosin causes fatigue.      Review of Systems:   As above.    Medications:  aspirin, 81 mg, Oral, Daily  atorvastatin, 20 mg, Oral, Daily  hydroCHLOROthiazide, 25 mg, Oral, Daily  insulin lispro, 0-14 Units, Subcutaneous, TID AC  losartan, 100 mg, Oral, Daily  metoprolol tartrate, 25 mg, Oral, Nightly  polyethylene glycol, 17 g, Oral, Daily  senna-docusate sodium, 2 tablet, Oral, Nightly  terazosin, 5 mg, Oral, Nightly        Objective     Vital Sign Min/Max for last 24 hours  Temp  Min: 96.8 °F (36 °C)  Max: 98.2 °F (36.8 °C)   BP  Min: 96/83  Max: 130/67   Pulse  Min: 56  Max: 80   Resp  Min: 10  Max: 24   SpO2  Min: 93 %  Max: 99 %   Flow (L/min)  Min: 2  Max: 3   No data recorded      Intake/Output Summary (Last 24 hours) at 10/19/2021 0755  Last data filed at 10/19/2021 0600  Gross per 24 hour   Intake 2180 ml   Output 1650 ml   Net 530 ml        Flowsheet Rows      First Filed Value   Admission Height 185.4 cm (73\") Documented at 10/18/2021 0732   Admission Weight 112 kg (246 lb) Documented at 10/18/2021 0732          Physical Exam:    General: Alert and oriented.   Cardiovascular: Heart has a nondisplaced focal PMI. Regular rate and rhythm without murmur, gallop or rub.  Lungs: Clear without rales or wheezes. Equal expansion is noted.   Abdomen: Soft, nontender.  Extremities: Show no edema. No hematoma or bruit in either groin.  Skin: warm and dry.     Results Review:    I reviewed the patient's new clinical results.  EKG:  Tele: Sinus rhythm/sinus " bradycardia    Labs:    Results from last 7 days   Lab Units 10/19/21  0322 10/18/21  1123 10/15/21  1024   SODIUM mmol/L 136 138 138   POTASSIUM mmol/L 4.1 4.0 4.1   CHLORIDE mmol/L 104 105 103   CO2 mmol/L 21.0* 23.0 22.0   BUN mg/dL 28* 22 35*   CREATININE mg/dL 1.59* 1.37* 1.56*   CALCIUM mg/dL 8.3* 8.8 9.7   BILIRUBIN mg/dL  --   --  0.3   ALK PHOS U/L  --   --  52   ALT (SGPT) U/L  --   --  25   AST (SGOT) U/L  --   --  27   GLUCOSE mg/dL 165* 173* 187*     Results from last 7 days   Lab Units 10/19/21  0322 10/18/21  1123 10/18/21  0956 10/15/21  1024   WBC 10*3/mm3 11.75* 12.40*  --  6.70   HEMOGLOBIN g/dL 11.8* 12.9*  --  13.8   HEMOGLOBIN, POC g/dL  --   --  12.2  --    HEMATOCRIT % 35.1* 39.1  --  42.2   HEMATOCRIT POC %  --   --  36*  --    PLATELETS 10*3/mm3 180 175  --  191     No results found for: TROPONINI, TROPONINT  Lab Results   Component Value Date    CHOL 123 08/13/2021     Lab Results   Component Value Date    TRIG 182 (H) 08/13/2021     Lab Results   Component Value Date    HDL 23 (L) 08/13/2021     No components found for: LDLCALC  Lab Results   Component Value Date    INR 1.03 10/15/2021    INR 1.02 08/13/2021    PROTIME 13.2 10/15/2021    PROTIME 13.1 08/13/2021         Ejection Fraction:    Assessment   Assessment:    1. Aortic stenosis  a. Nulcear stress test 2009, [facility]: positive thallium GXT for ischemia in inferior area.  Normal EF  b. LHC 8/21/2009: normal coronaries, false positive cardiolite GXT.   c. Echo 6/23/21, [facility]: EF 60-65%, moderate to severe AS (Ao max PG 78.1 mmHg,  Ao mean PG 46 mmHg, Ao V2 VTI 85.8 cm, JAMES 1.1cm2), RVSP < 35 mmHg.   d. Left heart catheterization 8/13/2021 revealing minor coronary disease.  e.  Transesophageal echocardiogram 8/13/2021 revealing severe aortic stenosis with  an EF of 55%  f. Status post TAVR using a 26 mm SUSHANT 3 pericardial prosthesis on 10/18/2021  2. Hypertension  3. Type II diabetes  4. Mild renal insufficiency  5. Prostate  cancer  6. Hyperlipidemia  7. ALONZO  8. Adrenal tumor  9. BPH       Plan:    Discontinue metoprolol and prazosin for now  Continue aspirin status post TAVR placement  Continue losartan and for hypertension  Continue Lipitor for hyperlipidemia  Home today  Follow-up with me 1 month      Kelli Singh MD  10/19/21  07:55 EDT

## 2021-10-19 NOTE — PROGRESS NOTES
CTS Progress Note       LOS: 1 day   Patient Care Team:  Nirmal Fuentes MD as PCP - General (Family Medicine)  Kelli Singh MD as Consulting Physician (Cardiology)  Kory Maldonado MD as Consulting Physician (Urology)    Chief Complaint: Aortic stenosis, severe    Vital Signs:  Temp:  [96.8 °F (36 °C)-98.2 °F (36.8 °C)] 97.3 °F (36.3 °C)  Heart Rate:  [57-80] 68  Resp:  [10-24] 24  BP: ()/(50-83) 125/58  Arterial Line BP: ()/(20-55) 114/36    Physical Exam: Alert conversant groin sites are satisfactory feet are warm and viable       Results:   Results from last 7 days   Lab Units 10/19/21  0322   WBC 10*3/mm3 11.75*   HEMOGLOBIN g/dL 11.8*   HEMATOCRIT % 35.1*   PLATELETS 10*3/mm3 180     Results from last 7 days   Lab Units 10/19/21  0322   SODIUM mmol/L 136   POTASSIUM mmol/L 4.1   CHLORIDE mmol/L 104   CO2 mmol/L 21.0*   BUN mg/dL 28*   CREATININE mg/dL 1.59*   GLUCOSE mg/dL 165*   CALCIUM mg/dL 8.3*           Imaging Results (Last 24 Hours)     ** No results found for the last 24 hours. **          Assessment      Severe AS     Dyslipidemia    Hypertension    H/O prostatectomy    CKD (baseline sCr ~1.5)     T2DM on insulin     Class 1 obesity in adult    Patient ambulatory over 220 feet last evening.  Groin sites are satisfactory and is in a sinus rhythm currently.  From my standpoint I believe he can be discharged home later today.    Plan   Discharge home satisfactory with cardiology    Please note that portions of this note were completed with a voice recognition program. Efforts were made to edit the dictations, but occasionally words are mistranscribed.    Joseph Major MD  10/19/21  06:43 EDT

## 2021-10-19 NOTE — DISCHARGE SUMMARY
Discharge Summary TAVR    Date of Admission: 10/18/2021  Date of Discharge:  10/19/2021    PCP: Nirmal Fuentes MD  ATTENDING: Joseph Major MD  Primary Cardiologist: Kelli Singh MD    Consults:   Consulting Physician(s)  Chat With All Active Members    Provider Relationship Specialty    Rose Goel MD  Consulting Physician Pulmonary Disease        TAVR Team  1.  José Antonio Bal MD  2.  Adriel Marie MD  3.  Rohit Negrete MD  4.  Kelli Singh MD  5.  Reggie Thurston MD  6.  Joseph Major MD    Presenting Problem/ HPI: Patient is a 71 y.o. single gentleman from [place], [place]. PCP, Dr. Nirmal Overton, sent him to Dr. Kelli Singh for orthostatic hypotension symptoms back in July 2021.  Upon further review during that consult, it was noted he been evaluated with echocardiogram prior to prostatectomy in 2020.  Moderate aortic stenosis was present.  He was lost to follow-up in the pandemic but echocardiogram performed in June 2021 revealed severe AS.  Primary care provider referred him back to Dr. Singh.      Since that time he has continued to experience presyncope and dyspnea on exertion as well as progressive daily fatigue.  He completed his preoperative TAVR studies and consultation with Dr. Major during July and August.  His August TAVR procedure was delayed until now due to Covid surge.    Discharge Diagnosis:     1.  Severe AS s/p TAVR 10/18/2021    2.  Dyslipidemia    3.  Hypertension    4.  H/O prostatectomy    5.  CKD (baseline sCr ~1.5)     6.  T2DM on insulin     7.  Class 1 obesity in adult    8.  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 NYHA class III pre-op      Procedures Performed:   TRANSCATHETER AORTIC VALVE REPLACEMENT utilizing 26 mm Shah Lorenzo 3 prostesis    Hospital Course:The patient is an 71 y.o. male from [address] [place] [id number] with symptoms of severe aortic stenosis including orthostatic hypotension, dizziness, GLEASON, and reduced  exercise tolerance which began to worsen since June 2021.  An echocardiogram was done 6/23/21 which revealed the aortic valve indices to meet TAVR criteria, which included the aortic valve area of 1 sq cm, mean gradient 46 mmHg and aortic valve V-max 4.42 m/sec.   He was evaluated by Yunior Major and Francisco and deemed to be low to intermediate risk of mortality with traditional open AVR primarily based upon low STS mortality risk score 1.9% but frail as evidenced by slow gait speed.  He was admitted the morning of the scheduled OR procedure on 10/18/2021.    Mr. Cruz was taken to the OR in fasting condition and placed under general anesthesia.  Dr. Kelli Singh placed left femoral arterial and venous access sheaths.  Using the venous access a temporary transvenous pacing electrode was advanced to the right ventricular apex where excellent pacing and sensing were noted.  A 5 Swiss pigtail catheter was advanced from the femoral arterial access to the aortic root for multiple intraprocedural aortograms and to confirm implantation angle.  At the same time Dr. Joseph Major assisted by Dr. José Antonio Bal accessed the right femoral artery with percutaneous cannulization and the patient was heparinized.  2 Perclose devices deployed and the 14 Swiss Shah sheath advanced over stiff wire into the lower descending thoracic aorta.  Aortic valve was crossed with a stiff wire and exchanged for safari wire.  The Shah Lorenzo 3 valve size 26 mm was then advanced across the annulus and during a period of rapid ventricular pacing the valve was deployed.  Following valve deployment transesophageal echocardiogram demonstrated normal prosthetic valvular function without evidence of significant paravalvular leak.  Aortogram demonstrated no evidence of aortic insufficiency.  The 14 Swiss sheath was removed and 2 Perclose devices secured for good hemostasis.  Arteriogram of the right leg demonstrated no  evidence of extravasation and no compromise of flow down the leg.  Patient tolerated extubation in the OR.  The pigtail catheter and transvenous pacing electrodes were discontinued.  Patient was transported to cardiothoracic ICU in stable condition.    Upon arrival in CT ICU patient was evaluated by the nursing staff and intensivist, Dr. Homar Goel.  Patient noted to be drowsy but no distress.  IV Cardene infusion at 10 mg/h utilized to maintain systolic pressure below 140 mmHg.  Supplemental oxygen at 4 L nasal cannula with oxygen saturation 95%.  Initial blood pressure 124/60 and pulse 66.  Cardiac rhythm normal sinus.  Immediate post procedure labs demonstrated stable hemoglobin at 12.2 g/dL with stable creatinine at 1.5 mg/dL.  No significant electrolyte disturbance.  Patient experienced no urinary retention.  Once acceptable ACT was noted the left femoral arterial and venous access sheaths were discontinued without formation of hematoma or bleeding.  Patient completed his 6 hours bedrest.  Patient ambulated 220 feet with minimal assistance.    Postop day #1: Overnight cardiac telemetry demonstrated sinus bradycardia.  Patient remained neurologically intact.  IV Cardene discontinued and systolic blood pressure was maintained at 110 to 120 mmHg.  With groin access sites remained soft and free from bleeding or hematoma formation.  Patient evaluated by Cardiology and CT surgery.  Due to overnight bradycardia Dr. Singh recommended discontinuation of the beta-blocker as well as prazosin.  He was approved for discharge home with family.  Patient lives alone with his sister is close by to monitor him carefully.  Patient was agreeable to participate in cardiac rehab and was scheduled for orientation date prior to discharge.  He will follow up with the TAVR clinic, PCP, cardiology to include echocardiogram and CT surgery.      Physical Exam on date of discharge:   Vital Sign Min/Max for last 24 hours  Temp  " Min: 96.8 °F (36 °C)  Max: 98.2 °F (36.8 °C)   BP  Min: 96/83  Max: 130/67   Pulse  Min: 56  Max: 80   Resp  Min: 10  Max: 24   SpO2  Min: 93 %  Max: 99 %                Flowsheet Rows      First Filed Value   Admission Height 185.4 cm (73\") Documented at 10/18/2021 0732   Admission Weight 112 kg (246 lb) Documented at 10/18/2021 0732             Physical Exam:   General: Alert and oriented. Up in chair  Cardiovascular: Heart has a nondisplaced focal PMI. Regular rate and rhythm without murmur, gallop or rub.  Lungs: Clear without rales or wheezes. Equal expansion is noted.   Abdomen: Soft, nontender.  Extremities: Show no edema.   Skin: warm and dry.                    Tele: sinus bradycardia      Pertinent Test Results:   Basic Metabolic Panel  Component   Ref Range & Units 03:22   (10/19/21) 1 d ago   (10/18/21) 1 d ago   (10/18/21) 1 d ago   (10/18/21) 1 d ago   (10/18/21) 1 d ago   (10/18/21) 1 d ago   (10/18/21)   Glucose   65 - 99 mg/dL 165 High   201 High  R, CM  223 High  R, CM  173 High   160 High  R, CM  131 High  R  110 R, CM    BUN   8 - 23 mg/dL 28 High     22       Creatinine   0.76 - 1.27 mg/dL 1.59 High     1.37 High        Sodium   136 - 145 mmol/L 136    138       Potassium   3.5 - 5.2 mmol/L 4.1    4.0       Chloride   98 - 107 mmol/L 104    105       CO2   22.0 - 29.0 mmol/L 21.0 Low     23.0       Calcium   8.6 - 10.5 mg/dL 8.3 Low     8.8       eGFR Non African Amer   >60 mL/min/1.73 43 Low     52 Low                CBC (No Diff)  Component   Ref Range & Units 07:47 03:22 1 d ago 4 d ago 2 mo ago   WBC   3.40 - 10.80 10*3/mm3 12.04 High   11.75 High   12.40 High   6.70  7.62    RBC   4.14 - 5.80 10*6/mm3 4.39  4.34  4.70  5.09  5.05    Hemoglobin   13.0 - 17.7 g/dL 12.0 Low   11.8 Low   12.9 Low   13.8  13.6    Hematocrit   37.5 - 51.0 % 36.0 Low   35.1 Low   39.1  42.2  41.9    MCV   79.0 - 97.0 fL 82.0  80.9  83.2  82.9  83.0    MCH   26.6 - 33.0 pg 27.3  27.2  27.4  27.1  26.9    MCHC "   31.5 - 35.7 g/dL 33.3  33.6  33.0  32.7  32.5    RDW   12.3 - 15.4 % 15.0  15.0  15.3  15.3  15.0    RDW-SD   37.0 - 54.0 fl 45.0  43.8  46.3  45.5  45.4    MPV   6.0 - 12.0 fL 9.9  10.6  10.0  9.9  10.0    Platelets   140 - 450 10*3/mm3 174  180  175  191  208           Discharge Disposition      Discharge Medications     Discharge Medications      Continue These Medications      Instructions Start Date   aspirin 81 MG EC tablet   81 mg, Oral, Daily      fenofibrate 160 MG tablet   160 mg, Oral, Daily      guaiFENesin 200 MG tablet   400 mg, Oral, Every 4 Hours PRN      hydroCHLOROthiazide 25 MG tablet  Commonly known as: HYDRODIURIL   25 mg, Oral, Daily      LIPITOR PO   20 mg, Daily      losartan 100 MG tablet  Commonly known as: COZAAR   100 mg, Oral, Daily      NOVOLOG FLEXPEN SC   30 Units, Sliding      Ozempic (0.25 or 0.5 MG/DOSE) 2 MG/1.5ML solution pen-injector  Generic drug: Semaglutide(0.25 or 0.5MG/DOS)   0.25 mg, Weekly      TRESIBA FLEXTOUCH SC   60 Units         Stop These Medications    metoprolol tartrate 25 MG tablet  Commonly known as: LOPRESSOR     prazosin 5 MG capsule  Commonly known as: MINIPRESS            Discharge Diet:   Diet Instructions     Diet: Cardiac, Consistent Carbohydrate      Discharge Diet:  Cardiac  Consistent Carbohydrate         Cardiac Low Sodium    Special Instructions:   1.  Incision care: Check groin sites daily. Clean sites daily with a clean washcloth, soap and water. Pat dry.  Report redness, drainage, swelling or significant tenderness to [facility] CT Surgery or to Mary BRAY at [facility].   2.  Patient may shower, but no tub baths until CT Surgery followup.   3.  Walk daily starting with 5 minutes four times daily.  Increase walking by one minute per walk as tolerated.    4.  No lifting over 10 lbs x 2 weeks.  5.  SBE card and education provided.  6.  No driving  X 2 weeks.  7.  Weigh daily and report  weight gain of 3 pounds overnight or 5 pounds in a week to [facility].   8.  Report symptoms of increased shortness of breath, chest pain, or temperature greater than 100.5 degrees to [facility] at [phone] or [facility] CT Surgery [phone].    Follow-up Appointments  Future Appointments   Date Time Provider Department Center   10/27/2021 10:30 AM Mary Read APRN [facility]   11/23/2021  9:00 AM ORIENTATION - [facility] CARD RE[facility]       Time spent for discharge:45 min    Thank you for allowing the [facility] TAVR team to care for Mr. Mitali Cruz.  If you have any questions or concerns please call Mary BRAY at [facility] [phone].  Dr. Major may be reached at [phone] and Yunior Singh may be reached at [phone].       KATARINA Lloyd  10/19/21  13:55 EDT

## 2021-10-19 NOTE — DISCHARGE INSTRUCTIONS
Special Instructions:   1.  Incision care: Check groin sites daily. Clean sites daily with a clean washcloth, soap and water. Pat dry.  Report redness, drainage, swelling or significant tenderness to [facility] CT Surgery or to Mary BRAY at [facility].   2.  Patient may shower, but no tub baths until CT Surgery followup.   3.  Walk daily starting with 5 minutes four times daily.  Increase walking by one minute per walk as tolerated.    4.  No lifting over 10 lbs x 2 weeks.  5.  SBE card and education provided.  6.  No driving  X 2 weeks.  7.  Weigh daily and report weight gain of 3 pounds overnight or 5 pounds in a week to [facility].   8.  Report symptoms of increased shortness of breath, chest pain, or temperature greater than 100.5 degrees to [facility] at [phone] or [facility] CT Surgery [phone].

## 2021-10-27 ENCOUNTER — OFFICE VISIT (OUTPATIENT)
Dept: CARDIOLOGY | Facility: HOSPITAL | Age: 71
End: 2021-10-27

## 2021-10-27 VITALS
SYSTOLIC BLOOD PRESSURE: 151 MMHG | DIASTOLIC BLOOD PRESSURE: 77 MMHG | HEIGHT: 73 IN | HEART RATE: 60 BPM | WEIGHT: 240 LBS | BODY MASS INDEX: 31.81 KG/M2

## 2021-10-27 DIAGNOSIS — I10 PRIMARY HYPERTENSION: Chronic | ICD-10-CM

## 2021-10-27 DIAGNOSIS — I35.0 AORTIC STENOSIS, SEVERE: Primary | ICD-10-CM

## 2021-10-27 PROCEDURE — 99442 PR PHYS/QHP TELEPHONE EVALUATION 11-20 MIN: CPT | Performed by: NURSE PRACTITIONER

## 2021-10-27 RX ORDER — PRAZOSIN HYDROCHLORIDE 5 MG/1
5 CAPSULE ORAL NIGHTLY
Qty: 30 CAPSULE | Refills: 0
Start: 2021-10-27 | End: 2021-11-24 | Stop reason: SDUPTHER

## 2021-10-27 NOTE — PROGRESS NOTES
"You have chosen to receive care through the use of telemedicine. Telemedicine enables health care providers at different locations to provide safe, effective, and convenient care through the use of technology. As with any health care service, there are risks associated with the use of telemedicine, including equipment failure, poor connections, and  issues.    • Do you understand the risks and benefits of telemedicine as I have explained them to you? Yes  • Have your questions regarding telemedicine been answered? Yes  • Do you consent to the use of telemedicine in your medical care today? Yes    Chief Complaint  Aortic Stenosis and Follow-up    Subjective    History of Present Illness {CC  Problem List  Visit  Diagnosis   Encounters  Notes  Medications  Labs  Result Review Imaging  Media :23}     Mitali Cruz presents to [facility] for   Mr. Cruz follows up via telephone visit today after TAVR on 10/18/21.  DC home next day as there were not procedural complications or post op issues.  He lives alone and states he is overall doing well.  No groin site pain or significant bruising. His only issues are elevated BP (metoprolol and prazosin were held @ discharge) and short lived intermittent feeling of light headedness.  One episode occurred yesterday during his walk and one episode occurred after walking down his stairwell.  Both resolved within a few minutes and he did not suffer any associated symptoms.      His blood glucose is well controlled.  He gives me a list of daily BP as follows:  Sunday 161/81 Pulse 53, Monday 160/70 pulse 55, Tuesday 151/73 pulse 60.         Objective     Vital Signs:   Vitals:    10/27/21 1030   BP: 151/77   Pulse: 60   Weight: 109 kg (240 lb)   Height: 185.4 cm (73\")     Body mass index is 31.66 kg/m².  Physical Exam  Vitals reviewed.   Constitutional:       General: He is not in acute distress.  Pulmonary:      Effort: " Pulmonary effort is normal.   Neurological:      General: No focal deficit present.      Mental Status: He is alert and oriented to person, place, and time.   Psychiatric:         Mood and Affect: Mood normal.         Behavior: Behavior normal.       Result Review  Data Reviewed:{ Labs  Result Review  Imaging  Med Tab  Media :23}     Recent hospitalization notes :    Discharge Summary by Mary Read APRN (10/19/2021 08:24)           Assessment and Plan {CC Problem List  Visit Diagnosis  ROS  Review (Popup)  Health Maintenance  Quality  BestPractice  Medications  SmartSets  SnapShot Encounters  Media :23}   1. Severe AS s/p TAVR on 10/18/21  - recovering well at home  - continue ambulation QID  - see BP med adjustments below  - reviewed follow up : CT Surgery follow up 11/15/21 to include HVC visit for 5 meter walk and KCCQ12, Cardiac rehab and post op echo 11/23/21, and Cardiology follow up 11/24/21  - continue aspirin      2. Primary hypertension  - above goal  - re-start prazosin tonight @ 5 mg   - continue HCTZ and cozaar 100 mg daily  - no BB due to bradycardia presently    Visit time: 15 minutes       Follow Up:  Return in about 1 year (around 10/27/2022) for TAVR one year.    Patient was given instructions and counseling regarding his condition or for health maintenance advice. Please see specific information pulled into the AVS if appropriate.  Patient was instructed to call the [facility] with any questions, concerns, or worsening symptoms.

## 2021-10-28 LAB — LV EF 2D ECHO EST: 60 %

## 2021-11-10 ENCOUNTER — OFFICE VISIT (OUTPATIENT)
Dept: CARDIOLOGY | Facility: HOSPITAL | Age: 71
End: 2021-11-10

## 2021-11-10 ENCOUNTER — HOSPITAL ENCOUNTER (OUTPATIENT)
Dept: CARDIOLOGY | Facility: HOSPITAL | Age: 71
Discharge: HOME | End: 2021-11-10

## 2021-11-10 ENCOUNTER — LAB (OUTPATIENT)
Dept: LAB | Facility: HOSPITAL | Age: 71
End: 2021-11-10

## 2021-11-10 VITALS
RESPIRATION RATE: 16 BRPM | OXYGEN SATURATION: 96 % | SYSTOLIC BLOOD PRESSURE: 182 MMHG | HEIGHT: 73 IN | WEIGHT: 246 LBS | HEART RATE: 64 BPM | BODY MASS INDEX: 32.6 KG/M2 | DIASTOLIC BLOOD PRESSURE: 82 MMHG | TEMPERATURE: 97.2 F

## 2021-11-10 DIAGNOSIS — E11.69 TYPE 2 DIABETES MELLITUS WITH OTHER SPECIFIED COMPLICATION, WITH LONG-TERM CURRENT USE OF INSULIN (HCC): Chronic | ICD-10-CM

## 2021-11-10 DIAGNOSIS — R42 DIZZINESS AND GIDDINESS: ICD-10-CM

## 2021-11-10 DIAGNOSIS — I35.0 AORTIC STENOSIS, SEVERE: Primary | ICD-10-CM

## 2021-11-10 DIAGNOSIS — I10 PRIMARY HYPERTENSION: Chronic | ICD-10-CM

## 2021-11-10 DIAGNOSIS — Z79.4 TYPE 2 DIABETES MELLITUS WITH OTHER SPECIFIED COMPLICATION, WITH LONG-TERM CURRENT USE OF INSULIN (HCC): Chronic | ICD-10-CM

## 2021-11-10 DIAGNOSIS — I35.0 AORTIC STENOSIS, SEVERE: ICD-10-CM

## 2021-11-10 DIAGNOSIS — N18.32 STAGE 3B CHRONIC KIDNEY DISEASE (HCC): ICD-10-CM

## 2021-11-10 LAB
ANION GAP SERPL CALCULATED.3IONS-SCNC: 11 MMOL/L (ref 5–15)
BUN SERPL-MCNC: 27 MG/DL (ref 8–23)
BUN/CREAT SERPL: 19.1 (ref 7–25)
CALCIUM SPEC-SCNC: 9.6 MG/DL (ref 8.6–10.5)
CHLORIDE SERPL-SCNC: 103 MMOL/L (ref 98–107)
CO2 SERPL-SCNC: 26 MMOL/L (ref 22–29)
CREAT SERPL-MCNC: 1.41 MG/DL (ref 0.76–1.27)
DEPRECATED RDW RBC AUTO: 46.2 FL (ref 37–54)
ERYTHROCYTE [DISTWIDTH] IN BLOOD BY AUTOMATED COUNT: 15.2 % (ref 12.3–15.4)
GFR SERPL CREATININE-BSD FRML MDRD: 50 ML/MIN/1.73
GLUCOSE SERPL-MCNC: 130 MG/DL (ref 65–99)
HCT VFR BLD AUTO: 42.1 % (ref 37.5–51)
HGB BLD-MCNC: 13.5 G/DL (ref 13–17.7)
MCH RBC QN AUTO: 27.1 PG (ref 26.6–33)
MCHC RBC AUTO-ENTMCNC: 32.1 G/DL (ref 31.5–35.7)
MCV RBC AUTO: 84.4 FL (ref 79–97)
PLATELET # BLD AUTO: 165 10*3/MM3 (ref 140–450)
PMV BLD AUTO: 9.8 FL (ref 6–12)
POTASSIUM SERPL-SCNC: 4.3 MMOL/L (ref 3.5–5.2)
RBC # BLD AUTO: 4.99 10*6/MM3 (ref 4.14–5.8)
SODIUM SERPL-SCNC: 140 MMOL/L (ref 136–145)
WBC # BLD AUTO: 7.69 10*3/MM3 (ref 3.4–10.8)

## 2021-11-10 PROCEDURE — 80048 BASIC METABOLIC PNL TOTAL CA: CPT

## 2021-11-10 PROCEDURE — 36415 COLL VENOUS BLD VENIPUNCTURE: CPT

## 2021-11-10 PROCEDURE — 85027 COMPLETE CBC AUTOMATED: CPT

## 2021-11-10 PROCEDURE — 99214 OFFICE O/P EST MOD 30 MIN: CPT | Performed by: NURSE PRACTITIONER

## 2021-11-10 RX ORDER — TRAMADOL HYDROCHLORIDE 50 MG/1
50 TABLET ORAL EVERY 6 HOURS PRN
COMMUNITY
Start: 2021-10-28

## 2021-11-10 NOTE — PROGRESS NOTES
"Chief Complaint  Dizziness and Follow-up    Subjective    History of Present Illness {CC  Problem List  Visit  Diagnosis   Encounters  Notes  Medications  Labs  Result Review Imaging  Media :23}     Mitali Cruz presents to [facility] for   Mr. Cruz comes in to the [facility] today at the request of his Endocrinologist, Dr. Sheikh.  He is s/p TAVR on 10/18/21.  He reports dizziness has markedly changed.  2-3 days ago he felt suddenly dizzy and hypoglycemic but glucose was 119.  He was sitting still when the episode began and it lasted 2-3 minutes.  Similar episodes have occurred at least three times.  Today, he felt unsteady to drive to his Endocrinologist and his sister is with him today.      Overall, he would have to say he feels \"about the same\" as before TAVR.  He is walking daily one block and he feels light headed with ambulation.  Pulse rates have been 60's.  SBP has been running high 140-170's.  He continues to report GLEASON and daily fatigue. He reports headaches several days per week.  He denies tachy- palpitations.  Patient states one instance he had fallen asleep in his recliner and \"dreamed he was dizzy\".  When he awakened, he was experiencing severe dizziness.    Following TAVR procedure, no cardiac rhythm changes were identified during his stay.  However, he was DC home without beta blocker due to relative bradycardia and fatigue.  He was also DC home without Minipress/ only to take his Cozaar.  However, when we spoke for one week telemedicine visit, he related high SBP's and Minipress was re-started.         Objective     Vital Signs:   Vitals:    11/10/21 1153   BP: (!) 182/82   BP Location: Left arm   Patient Position: Sitting   Cuff Size: Adult   Pulse: 64   Resp: 16   Temp: 97.2 °F (36.2 °C)   TempSrc: Temporal   SpO2: 96%   Weight: 112 kg (246 lb)   Height: 185.4 cm (73\")     Body mass index is 32.46 kg/m².  Physical Exam  Vitals reviewed.   Constitutional: "       General: He is not in acute distress.  HENT:      Head: Normocephalic.   Eyes:      Pupils: Pupils are equal, round, and reactive to light.   Cardiovascular:      Rate and Rhythm: Normal rate and regular rhythm.      Pulses: Normal pulses.      Heart sounds: Murmur heard.        Comments: Slight I/VI systolic murmur best heart RUSB  Pulmonary:      Effort: Pulmonary effort is normal. No respiratory distress.      Breath sounds: Normal breath sounds. No wheezing or rales.   Musculoskeletal:         General: Normal range of motion.      Right lower leg: No edema.      Left lower leg: No edema.   Skin:     General: Skin is warm and dry.   Neurological:      Mental Status: He is alert and oriented to person, place, and time.      Comments: Face appears symmetric.  Speech clear and fluent.  Somewhat unsteady when arising but this resolves once he is ambulating.  Gait appears normal.   Psychiatric:         Mood and Affect: Mood normal.         Behavior: Behavior normal.         Thought Content: Thought content normal.         Judgment: Judgment normal.              Result Review  Data Reviewed:{ Labs  Result Review  Imaging  Med Tab  Media :23}     Recent hospitalization notes :   Discharge Summary by Mary Read APRN (10/19/2021 08:24)           Assessment and Plan {CC Problem List  Visit Diagnosis  ROS  Review (Popup)  Health Maintenance  Quality  BestPractice  Medications  SmartSets  SnapShot Encounters  Media :23}   1. Severe AS s/p TAVR  - new onset dizziness  - suspect possible cardiac rhythm disruption, post op anemia, or electrolyte imbalance.  Consider neurologic source as well  - Updated Dr. Singh by phone and she recommends MCOT rather than extended holter for more immediately available rhythm reports  - continue aspirin  - instructed patient to call EMS and report to ER for any goyo syncope or new neurological symptoms      - Basic Metabolic Panel  - CBC (No Diff)  - Mobile  Cardiac Outpatient Telemetry    2. Dizziness and giddiness   - Mobile Cardiac Outpatient Telemetry    3. Stage 3b chronic kidney disease (HCC)  - Basic Metabolic Panel    4. Primary hypertension  - above goal  - current regimen losartan, HCTZ, and prazosin     5. Type 2 diabetes mellitus with other specified complication, with long-term current use of insulin (HCC)  - follows with [facility]      30 minutes    Follow Up {Instructions Charge Capture  Follow-up Communications :23}   Return in 5 days (on 11/15/2021) for Keep scheduled appointment with CT Surgery 11/15/21.    Patient was given instructions and counseling regarding his condition or for health maintenance advice. Please see specific information pulled into the AVS if appropriate.  Patient was instructed to call the [facility] with any questions, concerns, or worsening symptoms.

## 2021-11-10 NOTE — PROGRESS NOTES
[facility] Heart Monitor Documentation    Mitali Cruz  1950  [id number]  11/10/21    KATARINA Freeman    [] ZIO XT Patch  Model [id number] Prescribed for N/A Days    · Serial Number: (N + 9 Digits) N   · Apply-By Date on Box:   · USPS Tracking Number:   · USPS Tracking        [] [employer]ian MINI PLUS Mobile Cardiac Telemetry  Model BGMINIPLUS Prescribed for 30 Days    · Serial Number: (BGM + 7 Digits) [id number]  · Shipped-By Date on Box: [id number]  · UPS Tracking Number: [id number]  · UPS Tracking      [] [employer] MINI Holter Monitor  Model BGMINIEL Prescribed for N/A Days    · Serial Number: (7 Digits)   · Shipped-By Date on Box:   · UPS Tracking Number: [id number]  · UPS Tracking        This monitor was applied to the patient's chest and checked for proper functioning.  Mr. Mitali Cruz was instructed in the proper use of this monitor.  He was given the opportunity to ask questions and left the office with the device 's instruction manual.    Elyse Valdes MA, 13:05 EST, 11/10/21                  [facility]

## 2021-11-15 ENCOUNTER — CLINICAL SUPPORT (OUTPATIENT)
Dept: CARDIOLOGY | Facility: HOSPITAL | Age: 71
End: 2021-11-15

## 2021-11-15 ENCOUNTER — OFFICE VISIT (OUTPATIENT)
Dept: CARDIAC SURGERY | Facility: CLINIC | Age: 71
End: 2021-11-15

## 2021-11-15 VITALS
DIASTOLIC BLOOD PRESSURE: 80 MMHG | TEMPERATURE: 97.1 F | SYSTOLIC BLOOD PRESSURE: 170 MMHG | HEART RATE: 81 BPM | BODY MASS INDEX: 32.07 KG/M2 | HEIGHT: 73 IN | OXYGEN SATURATION: 98 % | WEIGHT: 242 LBS

## 2021-11-15 DIAGNOSIS — Z95.2 S/P TAVR (TRANSCATHETER AORTIC VALVE REPLACEMENT): Primary | ICD-10-CM

## 2021-11-15 DIAGNOSIS — I35.0 AORTIC STENOSIS, SEVERE: ICD-10-CM

## 2021-11-15 PROCEDURE — 99214 OFFICE O/P EST MOD 30 MIN: CPT | Performed by: NURSE PRACTITIONER

## 2021-11-15 PROCEDURE — 71046 X-RAY EXAM CHEST 2 VIEWS: CPT | Performed by: NURSE PRACTITIONER

## 2021-11-15 NOTE — PROGRESS NOTES
"     [facility] Cardiothoracic Surgery Office Follow Up Note     Date of Encounter: 11/15/2021     Name: Mitali Cruz  : 1950     Referred By: No ref. provider found  PCP: Nirmal Fuentes MD    Chief Complaint:    Chief Complaint   Patient presents with   • Post-op Follow-up     Hosp D/C TAVR 10/18/21-Aortic Valve Stenosis       Subjective      History of Present Illness:    Mitali Cruz is a 71 y.o. male non-smoker with a history of HTN, HLD on statin therapy, type 2 diabetes on insulin therapy (A1c 6.7), CKD, and severe aortic stenosis. Patient was last seen in clinic by Dr. Major 21 for consideration of TAVR with symptoms of shortness of breath and fatigue with minimal exertion.  He underwent TAVR with Shah's SUSHANT 3 valve on 10/18/2021 with Dr. Major.  Postoperatively, patient had bradycardia that resolved with discontinuation of beta-blocker and prazosin.  He was discharged on POD #1.  He has been seen by Mary BRAY in the [facility] 10/27/2021 and 11/10/2021 with complaints of new onset dizziness; Dr. Singh was consulted by phone and recommended MCOT rather than extended Holter.  Patient had repeat CBC and BMP showing improvement.  Patient presents today for postoperative follow-up.  Patient still reporting symptoms of lightheadedness/dizziness that occurs at random, states \"It hits me out of no where.\" He says it is not necessarily associated with position changes.  He denies any chest pain, shortness of air, fever, chills, or body aches.  He reports improvement in his fatigue with exertion.  He has been trying to walk daily.  He reports issues with his blood pressure being higher than it was preoperatively.  He has been seen postoperatively by his PCP and has follow-up with Dr. Singh on 112 with plans for repeat TTE.    Review of Systems:  Review of Systems   Constitutional: Positive for malaise/fatigue. Negative for chills, fever, night sweats and weight loss. "   HENT: Negative for hearing loss, odynophagia and sore throat.    Cardiovascular: Positive for palpitations. Negative for chest pain, dyspnea on exertion, leg swelling and orthopnea.   Respiratory: Negative for cough and hemoptysis.    Endocrine: Negative for cold intolerance, heat intolerance, polydipsia, polyphagia and polyuria.   Hematologic/Lymphatic: Does not bruise/bleed easily.   Skin: Negative for itching and rash.   Musculoskeletal: Negative for joint pain, joint swelling and myalgias.   Gastrointestinal: Positive for diarrhea. Negative for abdominal pain, constipation, hematemesis, hematochezia, melena, nausea and vomiting.   Genitourinary: Negative for dysuria, frequency and hematuria.   Neurological: Positive for dizziness and light-headedness. Negative for focal weakness, headaches, numbness and seizures.   Psychiatric/Behavioral: Negative for suicidal ideas.   All other systems reviewed and are negative.      I have reviewed the following portions of the patient's history: allergies, current medications, past family history, past medical history, past social history, past surgical history, problem list and ROS and confirm it's accurate.    Allergies:  Allergies   Allergen Reactions   • Lisinopril Cough   • Metformin GI Intolerance   • Nifedipine Swelling   • Singulair [Montelukast] Hives       Medications:      Current Outpatient Medications:   •  aspirin 81 MG EC tablet, Take 81 mg by mouth Daily., Disp: , Rfl:   •  Atorvastatin Calcium (LIPITOR PO), 20 mg Daily., Disp: , Rfl:   •  fenofibrate 160 MG tablet, Take 160 mg by mouth Daily., Disp: , Rfl:   •  guaiFENesin 200 MG tablet, Take 400 mg by mouth Every 4 (Four) Hours As Needed for Cough., Disp: , Rfl:   •  hydroCHLOROthiazide (HYDRODIURIL) 25 MG tablet, Take 25 mg by mouth Daily., Disp: , Rfl:   •  Insulin Aspart (NOVOLOG FLEXPEN SC), 30 Units. Sliding, Disp: , Rfl:   •  Insulin Degludec (TRESIBA FLEXTOUCH SC), 60 Units., Disp: , Rfl:   •   losartan (COZAAR) 100 MG tablet, Take 100 mg by mouth Daily., Disp: , Rfl:   •  Ozempic, 0.25 or 0.5 MG/DOSE, 2 MG/1.5ML solution pen-injector, 0.25 mg 1 (One) Time Per Week., Disp: , Rfl:   •  prazosin (MINIPRESS) 5 MG capsule, Take 1 capsule by mouth Every Night., Disp: 30 capsule, Rfl: 0  •  traMADol (ULTRAM) 50 MG tablet, Take 50 mg by mouth Every 6 (Six) Hours As Needed., Disp: , Rfl:     History:   Past Medical History:   Diagnosis Date   • Aortic valve stenosis    • Cancer (HCC)     prostate   • Carpal tunnel syndrome of left wrist    • Diabetes mellitus (HCC)    • History of echocardiogram    • History of stress test    • Hyperlipidemia    • Hypertension    • Kidney disorder    • Kidney stones    • Mitral valve disorder    • Tinnitus    • Wears glasses        Past Surgical History:   Procedure Laterality Date   • AORTIC VALVE REPAIR/REPLACEMENT N/A 10/18/2021    Procedure: TRANSCATHETER AORTIC VALVE REPLACEMENT;  Surgeon: Joseph Major MD;  Location:  [facility];  Service: Cardiothoracic;  Laterality: N/A;  flouro 10.48  dose 1021  contrast 45   • AORTIC VALVE REPAIR/REPLACEMENT N/A 10/18/2021    Procedure: Transcatheter Aortic Valve Replacement;  Surgeon: Kelli Singh MD;  Location:  [facility];  Service: Cardiovascular;  Laterality: N/A;   • APPENDECTOMY     • CARDIAC CATHETERIZATION N/A 8/13/2021    Procedure: LEFT HEART CATH;  Surgeon: Kelli Singh MD;  Location:  [facility] C[facility] INVASIVE LOCATION;  Service: Cardiology;  Laterality: N/A;   • CARPAL TUNNEL RELEASE      right   • PROSTATECTOMY     • SINUS SURGERY     • TONSILLECTOMY     • TRANSESOPHAGEAL ECHOCARDIOGRAM (CHRISTOPHER) N/A 10/18/2021    Procedure: TRANSESOPHAGEAL ECHOCARDIOGRAM WITH ANESTHESIA;  Surgeon: Joseph Major MD;  Location:  [facility];  Service: Cardiothoracic;  Laterality: N/A;   • TRIGGER FINGER RELEASE         Social History     Socioeconomic History   • Marital status: Single     Spouse  "name: NA   • Number of children: 0   • Years of education: HS + some college   Tobacco Use   • Smoking status: Never Smoker   • Smokeless tobacco: Never Used   Vaping Use   • Vaping Use: Never used   Substance and Sexual Activity   • Alcohol use: Not Currently   • Drug use: Not Currently   • Sexual activity: Defer        Family History   Problem Relation Age of Onset   • Heart attack Mother    • Kidney disease Father        Objective     Physical Exam:  Vitals:    11/15/21 1031   BP: 170/80   BP Location: Left arm   Patient Position: Sitting   Pulse: 81   Temp: 97.1 °F (36.2 °C)   SpO2: 98%   Weight: 110 kg (242 lb)   Height: 185.4 cm (73\")      Body mass index is 31.93 kg/m².    Physical Exam  Vitals and nursing note reviewed.   Constitutional:       Appearance: Normal appearance. He is well-developed.   HENT:      Head: Normocephalic and atraumatic.   Eyes:      Pupils: Pupils are equal, round, and reactive to light.   Neck:      Vascular: No carotid bruit.   Cardiovascular:      Rate and Rhythm: Normal rate and regular rhythm.      Pulses: Normal pulses.      Heart sounds: Normal heart sounds, S1 normal and S2 normal. No murmur heard.      Pulmonary:      Effort: Pulmonary effort is normal.      Breath sounds: Normal breath sounds.   Abdominal:      Palpations: Abdomen is soft.   Musculoskeletal:         General: No swelling.      Cervical back: Neck supple.      Right lower leg: No edema.      Left lower leg: No edema.   Skin:     General: Skin is warm and dry.      Capillary Refill: Capillary refill takes less than 2 seconds.      Findings: No bruising.      Comments: Groin incision: completely healed, no concerns for dehiscence, drainage, induration, erythema   Neurological:      General: No focal deficit present.      Mental Status: He is alert and oriented to person, place, and time. Mental status is at baseline.      GCS: GCS eye subscore is 4. GCS verbal subscore is 5. GCS motor subscore is 6.      Motor: " Motor function is intact.      Coordination: Coordination is intact.      Gait: Gait is intact.   Psychiatric:         Mood and Affect: Mood normal.         Speech: Speech normal.         Behavior: Behavior normal. Behavior is cooperative.         Cognition and Memory: Cognition normal.         Imaging/Labs:    XR Chest 2 View    Result Date: 11/15/2021  Minimal chronic interstitial change persists without new focal opacity. Cardiac valve replacement projects in expected location. There is no effusion or pneumothorax. Unremarkable heart and mediastinal contours.  This report was finalized on 11/15/2021 10:54 AM by Raman Polk.       CHRISTOPHER 10/18/2021:  · Estimated left ventricular EF = 60% Left ventricular systolic function is normal.  · Following placement of a 26 mm SUSHANT 3 pericardial prosthesis: No paravalvular leak is seen. Mean gradient 6 mmHg. Calculated aortic valve area 3.0 cm²  · Mild mitral valve regurgitation is present.  · Mild tricuspid valve regurgitation is present  · This report is being generated solely for the purposes of the registry. There will be no charge for this interpretation.        Results for orders placed during the hospital encounter of 08/13/21    Duplex Carotid Ultrasound CAR    Interpretation Summary  · Mild heterogeneous carotid atherosclerosis bilaterally.  · Right internal carotid artery stenosis of 0-49%.  · Left internal carotid artery stenosis of 0-49%.      Assessment / Plan      Assessment / Plan:  Diagnoses and all orders for this visit:    1. S/P TAVR (transcatheter aortic valve replacement) (Primary)    2. Aortic stenosis, severe       1. S/P TAVR with Shah's SUSHANT 3 valve on 10/18/2021 with Dr. Major: Patient with new onset dizziness/lightheadness not associated with position changes. He is being followed by Mary BRAY in the [facility] and Dr. Singh, currently wearing [employer] monitor. He reports this is contributing to feeling unsteady on his feet. He denies  any other acute complaints with improvements in his fatigue. Chest xray today in office was stable, reviewed with patient. Plans to follow up with Dr. Singh next week with repeat TTE. Dizziness could be related to cardiac rhythm disturbance vs other neurologic source, patient's labs showing improvement in anemia and stable electrolytes. Will plan to see patient back in 6 weeks for symptom re-evaluation.       Follow Up:   Return in about 6 weeks (around 12/27/2021).   Or sooner for any further concerns or worsening sign and symptoms. If unable to reach us in the office please dial 911 or go to the nearest emergency department.      Maia BRAY  [facility] Cardiothoracic Surgery    Time Spent: I spent 30 minutes caring for Mitali on this date of service. This time includes time spent by me in the following activities: preparing for the visit, reviewing tests, obtaining and/or reviewing a separately obtained history, performing a medically appropriate examination and/or evaluation, counseling and educating the patient/family/caregiver, documenting information in the medical record and independently interpreting results and communicating that information with the patient/family/caregiver.

## 2021-11-15 NOTE — PROGRESS NOTES
TAVR Clinic.      Patient reports back to [facility] for post TAVR KCCQ12 and 5 meter walk.  As noted last week, he continues to feel intermittent dizziness.  Continues wearing MCOT.  No abnormal rhythm transmissions received thus far.      Keep scheduled appointment for echo 11/23 and Dr. Singh on 11/24.  KCCQ12 score is 32/70.  He continues in NYHA class III symptoms.  See 5 meter walk data sheet.      Mary BRAY

## 2021-11-23 ENCOUNTER — HOSPITAL ENCOUNTER (OUTPATIENT)
Dept: CARDIOLOGY | Facility: HOSPITAL | Age: 71
Discharge: HOME | End: 2021-11-23
Admitting: NURSE PRACTITIONER

## 2021-11-23 ENCOUNTER — TREATMENT (OUTPATIENT)
Dept: CARDIAC REHAB | Facility: HOSPITAL | Age: 71
End: 2021-11-23

## 2021-11-23 DIAGNOSIS — I35.0 AORTIC STENOSIS, SEVERE: ICD-10-CM

## 2021-11-23 DIAGNOSIS — Z95.2 S/P TAVR (TRANSCATHETER AORTIC VALVE REPLACEMENT): Primary | ICD-10-CM

## 2021-11-23 LAB
BH CV ECHO MEAS - AO MAX PG (FULL): 30.5 MMHG
BH CV ECHO MEAS - AO MAX PG: 24 MMHG
BH CV ECHO MEAS - AO MEAN PG (FULL): 17 MMHG
BH CV ECHO MEAS - AO MEAN PG: 12 MMHG
BH CV ECHO MEAS - AO ROOT AREA (BSA CORRECTED): 1
BH CV ECHO MEAS - AO ROOT AREA: 4.7 CM^2
BH CV ECHO MEAS - AO ROOT DIAM: 2.4 CM
BH CV ECHO MEAS - AO V2 MAX: 244 CM/SEC
BH CV ECHO MEAS - AO V2 MEAN: 208.1 CM/SEC
BH CV ECHO MEAS - AO V2 VTI: 48.1 CM
BH CV ECHO MEAS - ASC AORTA: 3.4 CM
BH CV ECHO MEAS - AVA(I,A): 1.1 CM^2
BH CV ECHO MEAS - AVA(I,D): 1.6 CM^2
BH CV ECHO MEAS - AVA(V,A): 1.1 CM^2
BH CV ECHO MEAS - AVA(V,D): 1.1 CM^2
BH CV ECHO MEAS - BSA(HAYCOCK): 2.4 M^2
BH CV ECHO MEAS - BSA: 2.3 M^2
BH CV ECHO MEAS - BZI_BMI: 31.9 KILOGRAMS/M^2
BH CV ECHO MEAS - BZI_METRIC_HEIGHT: 185.4 CM
BH CV ECHO MEAS - BZI_METRIC_WEIGHT: 109.8 KG
BH CV ECHO MEAS - EDV(CUBED): 120.3 ML
BH CV ECHO MEAS - EDV(MOD-SP2): 89 ML
BH CV ECHO MEAS - EDV(MOD-SP4): 120 ML
BH CV ECHO MEAS - EDV(TEICH): 114.8 ML
BH CV ECHO MEAS - EF(CUBED): 78.1 %
BH CV ECHO MEAS - EF(MOD-BP): 67 %
BH CV ECHO MEAS - EF(MOD-SP2): 66.3 %
BH CV ECHO MEAS - EF(MOD-SP4): 66.7 %
BH CV ECHO MEAS - EF(TEICH): 70.1 %
BH CV ECHO MEAS - ESV(CUBED): 26.4 ML
BH CV ECHO MEAS - ESV(MOD-SP2): 30 ML
BH CV ECHO MEAS - ESV(MOD-SP4): 40 ML
BH CV ECHO MEAS - ESV(TEICH): 34.4 ML
BH CV ECHO MEAS - FS: 39.7 %
BH CV ECHO MEAS - IVS/LVPW: 1
BH CV ECHO MEAS - IVSD: 1.1 CM
BH CV ECHO MEAS - LA DIMENSION: 4.2 CM
BH CV ECHO MEAS - LA/AO: 1.7
BH CV ECHO MEAS - LAD MAJOR: 6.2 CM
BH CV ECHO MEAS - LAT PEAK E' VEL: 9.8 CM/SEC
BH CV ECHO MEAS - LATERAL E/E' RATIO: 8.9
BH CV ECHO MEAS - LV DIASTOLIC VOL/BSA (35-75): 51.4 ML/M^2
BH CV ECHO MEAS - LV IVRT: 0.1 SEC
BH CV ECHO MEAS - LV MASS(C)D: 207.7 GRAMS
BH CV ECHO MEAS - LV MASS(C)DI: 89 GRAMS/M^2
BH CV ECHO MEAS - LV MAX PG: 4.7 MMHG
BH CV ECHO MEAS - LV MEAN PG: 2.9 MMHG
BH CV ECHO MEAS - LV SYSTOLIC VOL/BSA (12-30): 17.1 ML/M^2
BH CV ECHO MEAS - LV V1 MAX: 108.6 CM/SEC
BH CV ECHO MEAS - LV V1 MEAN: 78.9 CM/SEC
BH CV ECHO MEAS - LV V1 VTI: 23.9 CM
BH CV ECHO MEAS - LVIDD: 4.9 CM
BH CV ECHO MEAS - LVIDS: 3 CM
BH CV ECHO MEAS - LVLD AP2: 8.8 CM
BH CV ECHO MEAS - LVLD AP4: 8.8 CM
BH CV ECHO MEAS - LVLS AP2: 6.9 CM
BH CV ECHO MEAS - LVLS AP4: 6.9 CM
BH CV ECHO MEAS - LVOT AREA (M): 3.1 CM^2
BH CV ECHO MEAS - LVOT AREA: 3 CM^2
BH CV ECHO MEAS - LVOT DIAM: 2 CM
BH CV ECHO MEAS - LVPWD: 1.1 CM
BH CV ECHO MEAS - MED PEAK E' VEL: 7.1 CM/SEC
BH CV ECHO MEAS - MEDIAL E/E' RATIO: 12.1
BH CV ECHO MEAS - MV A MAX VEL: 64.7 CM/SEC
BH CV ECHO MEAS - MV DEC SLOPE: 390.4 CM/SEC^2
BH CV ECHO MEAS - MV DEC TIME: 0.27 SEC
BH CV ECHO MEAS - MV E MAX VEL: 88.4 CM/SEC
BH CV ECHO MEAS - MV E/A: 1.4
BH CV ECHO MEAS - MV MAX PG: 5.6 MMHG
BH CV ECHO MEAS - MV MEAN PG: 2.1 MMHG
BH CV ECHO MEAS - MV P1/2T MAX VEL: 116.7 CM/SEC
BH CV ECHO MEAS - MV P1/2T: 87.6 MSEC
BH CV ECHO MEAS - MV V2 MAX: 118.1 CM/SEC
BH CV ECHO MEAS - MV V2 MEAN: 65.3 CM/SEC
BH CV ECHO MEAS - MV V2 VTI: 47.1 CM
BH CV ECHO MEAS - MVA P1/2T LCG: 1.9 CM^2
BH CV ECHO MEAS - MVA(P1/2T): 2.5 CM^2
BH CV ECHO MEAS - MVA(VTI): 1.5 CM^2
BH CV ECHO MEAS - PA ACC SLOPE: 501.5 CM/SEC^2
BH CV ECHO MEAS - PA ACC TIME: 0.16 SEC
BH CV ECHO MEAS - PA PR(ACCEL): 7.7 MMHG
BH CV ECHO MEAS - PI END-D VEL: 93.7 CM/SEC
BH CV ECHO MEAS - RAP SYSTOLE: 3 MMHG
BH CV ECHO MEAS - RVSP: 38 MMHG
BH CV ECHO MEAS - SI(AO): 127.8 ML/M^2
BH CV ECHO MEAS - SI(CUBED): 40.2 ML/M^2
BH CV ECHO MEAS - SI(LVOT): 30.7 ML/M^2
BH CV ECHO MEAS - SI(MOD-SP2): 25.3 ML/M^2
BH CV ECHO MEAS - SI(MOD-SP4): 34.3 ML/M^2
BH CV ECHO MEAS - SI(TEICH): 34.5 ML/M^2
BH CV ECHO MEAS - SV(AO): 298.3 ML
BH CV ECHO MEAS - SV(CUBED): 93.9 ML
BH CV ECHO MEAS - SV(LVOT): 71.6 ML
BH CV ECHO MEAS - SV(MOD-SP2): 59 ML
BH CV ECHO MEAS - SV(MOD-SP4): 80 ML
BH CV ECHO MEAS - SV(TEICH): 80.4 ML
BH CV ECHO MEAS - TAPSE (>1.6): 3.2 CM
BH CV ECHO MEAS - TR MAX PG: 35 MMHG
BH CV ECHO MEAS - TR MAX VEL: 296 CM/SEC
BH CV ECHO MEASUREMENTS AVERAGE E/E' RATIO: 10.46
BH CV VAS BP RIGHT ARM: NORMAL MMHG
BH CV XLRA - RV BASE: 4.2 CM
BH CV XLRA - RV LENGTH: 7.2 CM
BH CV XLRA - RV MID: 3.2 CM
BH CV XLRA - TDI S': 12.4 CM/SEC
LEFT ATRIUM VOLUME INDEX: 27.4 ML/M^2
LEFT ATRIUM VOLUME: 64 ML
LV EF 2D ECHO EST: 60 %
MAXIMAL PREDICTED HEART RATE: 151 BPM
STRESS TARGET HR: 128 BPM

## 2021-11-23 PROCEDURE — 93306 TTE W/DOPPLER COMPLETE: CPT

## 2021-11-23 PROCEDURE — 93798 PHYS/QHP OP CAR RHAB W/ECG: CPT

## 2021-11-23 PROCEDURE — 93306 TTE W/DOPPLER COMPLETE: CPT | Performed by: INTERNAL MEDICINE

## 2021-11-24 ENCOUNTER — OFFICE VISIT (OUTPATIENT)
Dept: CARDIOLOGY | Facility: CLINIC | Age: 71
End: 2021-11-24

## 2021-11-24 ENCOUNTER — APPOINTMENT (OUTPATIENT)
Dept: CARDIOLOGY | Facility: HOSPITAL | Age: 71
End: 2021-11-24

## 2021-11-24 VITALS
SYSTOLIC BLOOD PRESSURE: 132 MMHG | BODY MASS INDEX: 32.47 KG/M2 | HEART RATE: 64 BPM | OXYGEN SATURATION: 97 % | DIASTOLIC BLOOD PRESSURE: 60 MMHG | HEIGHT: 73 IN | WEIGHT: 245 LBS

## 2021-11-24 DIAGNOSIS — I35.0 AORTIC STENOSIS, SEVERE: Primary | ICD-10-CM

## 2021-11-24 DIAGNOSIS — I10 PRIMARY HYPERTENSION: Chronic | ICD-10-CM

## 2021-11-24 PROCEDURE — 99213 OFFICE O/P EST LOW 20 MIN: CPT | Performed by: NURSE PRACTITIONER

## 2021-11-24 RX ORDER — PRAZOSIN HYDROCHLORIDE 5 MG/1
5 CAPSULE ORAL EVERY 12 HOURS
Qty: 180 CAPSULE | Refills: 3
Start: 2021-11-24 | End: 2021-12-06 | Stop reason: SDUPTHER

## 2021-11-24 NOTE — PROGRESS NOTES
[facility] Cardiology    Patient ID: Mitali Cruz is a 71 y.o. male.  : 1950   Contact: [phone]    Encounter date: 2021    PCP: Nirmal Fuentes MD      Chief complaint:   Chief Complaint   Patient presents with   • Aortic stenosis, moderate     PROBLEM LIST:  1. Aortic stenosis  a. Nulcear stress test , [facility]: positive thallium GXT for ischemia in inferior area. Normal EF  b. [facility] 2009: normal coronaries, false positive cardiolite GXT.   c. Echo 21, S: EF 60-65%, moderate to severe AS (Ao max PG 78.1 mmHg, Ao mean PG 46 mmHg, Ao V2 VTI 85.8 cm, JAMES 1.1cm2), RVSP < 35 mmHg.    d. CHRISTOPHER 21: EF 55%, moderate LVH, severe AS, aortic annulus 2.4 cm2, mild MR, mild TR.   e. [facility] 21: minor CAD, severe AS.   f. S/p TAVR using a 26 mm SUSHANT 3 pericardial prosthesis, 10/18/21, PW.   g. Post op CHRISTOPHER: Ao max PG 13 mmHg and mean PG 6 mmHg.   h. Echo 21: EF 60%, grade I diastolic dysfunction, 26 mm tissue Shah SUSHANT 3 valve in the aortic position. Mean gradient 11 mmHg. JAMES 1.6cm2. Trace paravalvular leak beneath the MV leaflet, trace to mild MR. Mild TR.   2. Dizziness  a. 30-day event monitor pending  3. Hypertension  4. Type II diabetes  5. Prostate cancer  6. Hyperlipidemia  a. FLP 3/3/21: , Trig 681, LDL not calculated, HDL 21.9.   b. CMP 3/3/21: AST 31, ALT 31, creatinine 1.4  7. ALONZO  8. Adrenal tumor  9. BPH    Allergies and Medications:  Allergies   Allergen Reactions   • Lisinopril Cough   • Metformin GI Intolerance   • Nifedipine Swelling   • Singulair [Montelukast] Hives       Current Medications:    Current Outpatient Medications:   •  aspirin 81 MG EC tablet, Take 81 mg by mouth Daily., Disp: , Rfl:   •  atorvastatin (Lipitor) 20 MG tablet, Take 20 mg by mouth Daily., Disp: , Rfl:   •  fenofibrate 160 MG tablet, Take 160 mg by mouth Daily., Disp: , Rfl:   •  guaiFENesin 200 MG tablet, Take 400 mg by mouth Every 4 (Four) Hours As  "Needed for Cough., Disp: , Rfl:   •  hydroCHLOROthiazide (HYDRODIURIL) 25 MG tablet, Take 25 mg by mouth Daily., Disp: , Rfl:   •  Insulin Aspart (NOVOLOG FLEXPEN SC), 30 Units. Sliding, Disp: , Rfl:   •  Insulin Degludec (TRESIBA FLEXTOUCH SC), 60 Units., Disp: , Rfl:   •  losartan (COZAAR) 100 MG tablet, Take 100 mg by mouth Daily., Disp: , Rfl:   •  Ozempic, 0.25 or 0.5 MG/DOSE, 2 MG/1.5ML solution pen-injector, 0.25 mg 1 (One) Time Per Week., Disp: , Rfl:   •  prazosin (MINIPRESS) 5 MG capsule, Take 1 capsule by mouth Every 12 (Twelve) Hours., Disp: 180 capsule, Rfl: 3  •  traMADol (ULTRAM) 50 MG tablet, Take 50 mg by mouth Every 6 (Six) Hours As Needed., Disp: , Rfl:     HPI    Mitali Cruz is a 71 y.o. male who presents today for follow up on aortic stenosis, s/p TAVR, HTN, HLD. Since last visit, patient has done well overall. BP at home has been 130-150's systolic. He has no complaints of CP, SOA, PND, orthopnea, LH, dizziness, palpitations.       The following portions of the patient's history were reviewed and updated as appropriate: allergies, current medications and problem list.    Pertinent positives as listed in the HPI.  All other systems reviewed are negative.         Vitals:    11/24/21 1032   BP: 132/60   BP Location: Left arm   Patient Position: Sitting   Pulse: 64   SpO2: 97%   Weight: 111 kg (245 lb)   Height: 185.4 cm (73\")       Physical Exam:  General: Alert and oriented.  Neck: Jugular venous pressure is within normal limits. Carotids have normal upstrokes without bruits.   Cardiovascular: Heart has a nondisplaced focal PMI. Regular rate and rhythm without murmur, gallop or rub.  Lungs: Clear without rales or wheezes. Equal expansion is noted.   Extremities: Show no edema.  Skin: Warm and dry.  Neurologic: Nonfocal.     Diagnostic Data:  Lab Results   Component Value Date    GLUCOSE 130 (H) 11/10/2021    BUN 27 (H) 11/10/2021    CREATININE 1.41 (H) 11/10/2021    EGFRIFNONA 50 (L) " 11/10/2021    BCR 19.1 11/10/2021    K 4.3 11/10/2021    CO2 26.0 11/10/2021    CALCIUM 9.6 11/10/2021    ALBUMIN 4.50 10/15/2021    AST 27 10/15/2021    ALT 25 10/15/2021     Lab Results   Component Value Date    GLUCOSE 130 (H) 11/10/2021    CALCIUM 9.6 11/10/2021     11/10/2021    K 4.3 11/10/2021    CO2 26.0 11/10/2021     11/10/2021    BUN 27 (H) 11/10/2021    CREATININE 1.41 (H) 11/10/2021    EGFRIFNONA 50 (L) 11/10/2021    BCR 19.1 11/10/2021    ANIONGAP 11.0 11/10/2021     Lab Results   Component Value Date    CHOL 123 08/13/2021    TRIG 182 (H) 08/13/2021    HDL 23 (L) 08/13/2021    LDL 69 08/13/2021     Lab Results   Component Value Date    WBC 7.69 11/10/2021    HGB 13.5 11/10/2021    HCT 42.1 11/10/2021    MCV 84.4 11/10/2021     11/10/2021     No results found for: TSH    Procedures     ASSESSMENT:    ICD-10-CM ICD-9-CM   1. Severe AS   I35.0 424.1   2. Primary hypertension  I10 401.9     Lab results found above were reviewed with the patient.      PLAN:  1. Increase Prazosin to 5 mg bid.   2. Repeat echo in one year for follow up on TAVR.   3. Patient was counseled to begin aerobic exercise 30 min per day for at least 4 days per week.   4. Continue losartan, HCTZ for HTN  5. Continue ASA for history of aortic stenosis, s/p TAVR.  6. Continue all other current medications.  7. F/up in 6 months, sooner if needed.      Electronically signed by KATARINA Sue, 11/24/21, 3:59 PM EST.

## 2021-12-01 ENCOUNTER — TREATMENT (OUTPATIENT)
Dept: CARDIAC REHAB | Facility: HOSPITAL | Age: 71
End: 2021-12-01

## 2021-12-01 DIAGNOSIS — Z95.2 S/P AVR: ICD-10-CM

## 2021-12-01 DIAGNOSIS — Z95.2 S/P TAVR (TRANSCATHETER AORTIC VALVE REPLACEMENT): Primary | ICD-10-CM

## 2021-12-01 PROCEDURE — 93798 PHYS/QHP OP CAR RHAB W/ECG: CPT

## 2021-12-03 ENCOUNTER — TREATMENT (OUTPATIENT)
Dept: CARDIAC REHAB | Facility: HOSPITAL | Age: 71
End: 2021-12-03

## 2021-12-03 DIAGNOSIS — Z95.2 S/P AVR: Primary | ICD-10-CM

## 2021-12-03 PROCEDURE — 93798 PHYS/QHP OP CAR RHAB W/ECG: CPT

## 2021-12-06 ENCOUNTER — TREATMENT (OUTPATIENT)
Dept: CARDIAC REHAB | Facility: HOSPITAL | Age: 71
End: 2021-12-06

## 2021-12-06 DIAGNOSIS — Z95.2 S/P AVR: Primary | ICD-10-CM

## 2021-12-06 PROCEDURE — 93798 PHYS/QHP OP CAR RHAB W/ECG: CPT

## 2021-12-06 RX ORDER — PRAZOSIN HYDROCHLORIDE 5 MG/1
5 CAPSULE ORAL EVERY 12 HOURS
Qty: 180 CAPSULE | Refills: 3 | Status: SHIPPED | OUTPATIENT
Start: 2021-12-06 | End: 2022-01-12 | Stop reason: SDUPTHER

## 2021-12-08 ENCOUNTER — TREATMENT (OUTPATIENT)
Dept: CARDIAC REHAB | Facility: HOSPITAL | Age: 71
End: 2021-12-08

## 2021-12-08 DIAGNOSIS — Z95.2 S/P AVR: Primary | ICD-10-CM

## 2021-12-08 PROCEDURE — 93798 PHYS/QHP OP CAR RHAB W/ECG: CPT

## 2021-12-10 ENCOUNTER — TREATMENT (OUTPATIENT)
Dept: CARDIAC REHAB | Facility: HOSPITAL | Age: 71
End: 2021-12-10

## 2021-12-10 DIAGNOSIS — Z95.2 S/P TAVR (TRANSCATHETER AORTIC VALVE REPLACEMENT): Primary | ICD-10-CM

## 2021-12-10 PROCEDURE — 93798 PHYS/QHP OP CAR RHAB W/ECG: CPT

## 2021-12-13 ENCOUNTER — TREATMENT (OUTPATIENT)
Dept: CARDIAC REHAB | Facility: HOSPITAL | Age: 71
End: 2021-12-13

## 2021-12-13 DIAGNOSIS — Z95.2 S/P TAVR (TRANSCATHETER AORTIC VALVE REPLACEMENT): Primary | ICD-10-CM

## 2021-12-13 PROCEDURE — 93798 PHYS/QHP OP CAR RHAB W/ECG: CPT

## 2021-12-15 ENCOUNTER — TREATMENT (OUTPATIENT)
Dept: CARDIAC REHAB | Facility: HOSPITAL | Age: 71
End: 2021-12-15

## 2021-12-15 DIAGNOSIS — Z95.2 S/P TAVR (TRANSCATHETER AORTIC VALVE REPLACEMENT): Primary | ICD-10-CM

## 2021-12-15 PROCEDURE — 93798 PHYS/QHP OP CAR RHAB W/ECG: CPT

## 2021-12-17 ENCOUNTER — TREATMENT (OUTPATIENT)
Dept: CARDIAC REHAB | Facility: HOSPITAL | Age: 71
End: 2021-12-17

## 2021-12-17 DIAGNOSIS — Z95.2 S/P TAVR (TRANSCATHETER AORTIC VALVE REPLACEMENT): Primary | ICD-10-CM

## 2021-12-17 PROCEDURE — 93798 PHYS/QHP OP CAR RHAB W/ECG: CPT

## 2021-12-17 NOTE — PROGRESS NOTES
Attended Phase II Cardiac Rehab. No medication or health history changes reported. See [facility] for details.

## 2021-12-20 ENCOUNTER — TREATMENT (OUTPATIENT)
Dept: CARDIAC REHAB | Facility: HOSPITAL | Age: 71
End: 2021-12-20

## 2021-12-20 DIAGNOSIS — Z95.2 S/P TAVR (TRANSCATHETER AORTIC VALVE REPLACEMENT): Primary | ICD-10-CM

## 2021-12-20 PROCEDURE — 93798 PHYS/QHP OP CAR RHAB W/ECG: CPT

## 2021-12-22 ENCOUNTER — TREATMENT (OUTPATIENT)
Dept: CARDIAC REHAB | Facility: HOSPITAL | Age: 71
End: 2021-12-22

## 2021-12-22 DIAGNOSIS — Z95.2 S/P TAVR (TRANSCATHETER AORTIC VALVE REPLACEMENT): Primary | ICD-10-CM

## 2021-12-22 PROCEDURE — 93798 PHYS/QHP OP CAR RHAB W/ECG: CPT

## 2021-12-27 ENCOUNTER — OFFICE VISIT (OUTPATIENT)
Dept: CARDIAC SURGERY | Facility: CLINIC | Age: 71
End: 2021-12-27

## 2021-12-27 ENCOUNTER — TREATMENT (OUTPATIENT)
Dept: CARDIAC REHAB | Facility: HOSPITAL | Age: 71
End: 2021-12-27

## 2021-12-27 VITALS
HEIGHT: 73 IN | DIASTOLIC BLOOD PRESSURE: 74 MMHG | HEART RATE: 76 BPM | BODY MASS INDEX: 32.84 KG/M2 | SYSTOLIC BLOOD PRESSURE: 138 MMHG | WEIGHT: 247.8 LBS | OXYGEN SATURATION: 98 % | TEMPERATURE: 98.1 F

## 2021-12-27 DIAGNOSIS — Z95.2 S/P TAVR (TRANSCATHETER AORTIC VALVE REPLACEMENT): Primary | ICD-10-CM

## 2021-12-27 PROCEDURE — 93798 PHYS/QHP OP CAR RHAB W/ECG: CPT

## 2021-12-27 PROCEDURE — 99213 OFFICE O/P EST LOW 20 MIN: CPT | Performed by: NURSE PRACTITIONER

## 2021-12-27 NOTE — PROGRESS NOTES
[facility] Cardiothoracic Surgery Office Follow Up Note     Date of Encounter: 2021     Name: Mitali Cruz  : 1950     Referred By: No ref. provider found  PCP: Nirmal Fuentes MD    Chief Complaint:    Chief Complaint   Patient presents with   • Follow-up     6 week follow up TAVR 10/18/21. Pt states that he is doing very well only complaint is that he is still light headed from time to time.        Subjective      History of Present Illness:    Mitali Cruz is a 71 y.o. male non-smoker with a history of HTN, HLD on statin therapy, type 2 diabetes on insulin therapy (A1c 6.7), CKD, and severe aortic stenosis. Patient was last seen in clinic by Dr. Major 21 for consideration of TAVR with symptoms of shortness of breath and fatigue with minimal exertion.  He underwent TAVR with Shah's SUSHANT 3 valve on 10/18/2021 with Dr. Major.  Postoperatively, patient had bradycardia that resolved with discontinuation of beta-blocker and prazosin.  He was discharged on POD #1.  He has been seen by Mary BRAY in the [facility] 10/27/2021 and 11/10/2021 with complaints of new onset dizziness; Dr. Singh was consulted by phone and recommended MCOT rather than extended Holter. Patient was last seen in clinic by me on 11/15/21 for postoperative follow up, doing well but with continued complaints of dizziness. He has been participating in cardiac rehab. He has had repeat TTE on 21. Patient presents today for 6 weeks follow up. He reports overall improvement in his dizziness symptoms, reports intermittent episodes mostly with position changes. He denies any associated chest pain or SOA.  He has decreased some of his diabetes medicine because he thought his symptoms might have been related. He has been participating in cardiac rehab, doing well. He has been seen once by Dr. Singh office once but has not had follow up with them since he returned his MCOT/ had TTE performed. He does  have a pcp but has not been seen by him for dizziness symptoms.     Review of Systems:  Review of Systems   Constitutional: Positive for malaise/fatigue. Negative for chills, decreased appetite and fever.   Cardiovascular: Positive for leg swelling (some slight ankle swelling). Negative for chest pain, claudication, dyspnea on exertion, irregular heartbeat, near-syncope, orthopnea, palpitations and syncope.   Respiratory: Negative for cough, hemoptysis, shortness of breath, sputum production and wheezing.    Hematologic/Lymphatic: Positive for bleeding problem. Bruises/bleeds easily.   Skin: Negative for color change, poor wound healing and rash.   Musculoskeletal: Positive for arthritis and joint pain (bilateral hand pains- HX of carpal tunnel ). Negative for back pain and falls.   Gastrointestinal: Positive for bloating, abdominal pain, diarrhea and heartburn. Negative for constipation, nausea and vomiting.   Neurological: Positive for light-headedness (comes and goes). Negative for focal weakness, numbness and paresthesias.   Psychiatric/Behavioral: Negative for depression. The patient is nervous/anxious. The patient does not have insomnia.        I have reviewed the following portions of the patient's history: allergies, current medications, past family history, past medical history, past social history, past surgical history, problem list and ROS and confirm it's accurate.    Allergies:  Allergies   Allergen Reactions   • Lisinopril Cough   • Metformin GI Intolerance   • Nifedipine Swelling   • Singulair [Montelukast] Hives       Medications:      Current Outpatient Medications:   •  aspirin 81 MG EC tablet, Take 81 mg by mouth Daily., Disp: , Rfl:   •  atorvastatin (Lipitor) 20 MG tablet, Take 20 mg by mouth Daily., Disp: , Rfl:   •  fenofibrate 160 MG tablet, Take 160 mg by mouth Daily., Disp: , Rfl:   •  guaiFENesin 200 MG tablet, Take 400 mg by mouth Every 4 (Four) Hours As Needed for Cough., Disp: , Rfl:    •  hydroCHLOROthiazide (HYDRODIURIL) 25 MG tablet, Take 25 mg by mouth Daily., Disp: , Rfl:   •  Insulin Aspart (NOVOLOG FLEXPEN SC), 30 Units. Sliding, Disp: , Rfl:   •  Insulin Degludec (TRESIBA FLEXTOUCH SC), 60 Units., Disp: , Rfl:   •  losartan (COZAAR) 100 MG tablet, Take 100 mg by mouth Daily., Disp: , Rfl:   •  Ozempic, 0.25 or 0.5 MG/DOSE, 2 MG/1.5ML solution pen-injector, 0.25 mg 1 (One) Time Per Week., Disp: , Rfl:   •  prazosin (MINIPRESS) 5 MG capsule, Take 1 capsule by mouth Every 12 (Twelve) Hours., Disp: 180 capsule, Rfl: 3  •  traMADol (ULTRAM) 50 MG tablet, Take 50 mg by mouth Every 6 (Six) Hours As Needed., Disp: , Rfl:     History:   Past Medical History:   Diagnosis Date   • Aortic valve stenosis    • Cancer (HCC)     prostate   • Carpal tunnel syndrome of left wrist    • Diabetes mellitus (HCC)    • History of echocardiogram    • History of stress test    • Hyperlipidemia    • Hypertension    • Kidney disorder    • Kidney stones    • Mitral valve disorder    • Tinnitus    • Wears glasses        Past Surgical History:   Procedure Laterality Date   • AORTIC VALVE REPAIR/REPLACEMENT N/A 10/18/2021    Procedure: TRANSCATHETER AORTIC VALVE REPLACEMENT;  Surgeon: Joseph Major MD;  Location: [facility];  Service: Cardiothoracic;  Laterality: N/A;  flouro 10.48  dose 1021  contrast 45   • AORTIC VALVE REPAIR/REPLACEMENT N/A 10/18/2021    Procedure: Transcatheter Aortic Valve Replacement;  Surgeon: Kelli Singh MD;  Location: [facility];  Service: Cardiovascular;  Laterality: N/A;   • APPENDECTOMY     • CARDIAC CATHETERIZATION N/A 8/13/2021    Procedure: LEFT HEART CATH;  Surgeon: Kelli Sinhg MD;  Location: [facility] CATH INVASIVE LOCATION;  Service: Cardiology;  Laterality: N/A;   • CARPAL TUNNEL RELEASE      right   • PROSTATECTOMY     • SINUS SURGERY     • TONSILLECTOMY     • TRANSESOPHAGEAL ECHOCARDIOGRAM (CHRISTOPHER) N/A 10/18/2021    Procedure: TRANSESOPHAGEAL  "ECHOCARDIOGRAM WITH ANESTHESIA;  Surgeon: Joseph Major MD;  Location: [facility];  Service: Cardiothoracic;  Laterality: N/A;   • TRIGGER FINGER RELEASE         Social History     Socioeconomic History   • Marital status: Single     Spouse name: NA   • Number of children: 0   • Years of education: HS + some college   Tobacco Use   • Smoking status: Never Smoker   • Smokeless tobacco: Never Used   Vaping Use   • Vaping Use: Never used   Substance and Sexual Activity   • Alcohol use: Not Currently   • Drug use: Never   • Sexual activity: Defer        Family History   Problem Relation Age of Onset   • Heart attack Mother    • Kidney disease Father        Objective     Physical Exam:  Vitals:    12/27/21 0903   BP: 138/74   Pulse: 76   Temp: 98.1 °F (36.7 °C)   SpO2: 98%   Weight: 112 kg (247 lb 12.8 oz)   Height: 185.4 cm (73\")      Body mass index is 32.69 kg/m².    Physical Exam  Vitals and nursing note reviewed.   Constitutional:       Appearance: Normal appearance. He is well-developed.   HENT:      Head: Normocephalic and atraumatic.   Eyes:      Pupils: Pupils are equal, round, and reactive to light.   Neck:      Vascular: No carotid bruit.   Cardiovascular:      Rate and Rhythm: Normal rate and regular rhythm.      Pulses: Normal pulses.      Heart sounds: S1 normal and S2 normal. Murmur heard.       Pulmonary:      Effort: Pulmonary effort is normal.      Breath sounds: Normal breath sounds.   Abdominal:      Palpations: Abdomen is soft.   Musculoskeletal:         General: No swelling.      Cervical back: Neck supple.      Right lower leg: No edema.      Left lower leg: No edema.   Skin:     General: Skin is warm and dry.      Capillary Refill: Capillary refill takes less than 2 seconds.      Findings: No bruising.   Neurological:      General: No focal deficit present.      Mental Status: He is alert and oriented to person, place, and time. Mental status is at baseline.      GCS: GCS eye subscore " is 4. GCS verbal subscore is 5. GCS motor subscore is 6.      Motor: Motor function is intact.      Coordination: Coordination is intact.      Gait: Gait is intact.   Psychiatric:         Mood and Affect: Mood normal.         Speech: Speech normal.         Behavior: Behavior normal. Behavior is cooperative.         Cognition and Memory: Cognition normal.         Imaging/Labs:    TTE Result date 11/23/21:  · Estimated left ventricular EF = 60% Left ventricular systolic function is normal.  · Left ventricular diastolic function is consistent with (grade I) impaired relaxation.  · Left ventricular wall thickness is consistent with mild concentric hypertrophy.  · There is a 26 mm tissue Hsah SUSHANT 3 valve in the aortic position. Mean gradient 11 mmHg. JAMES 1.6 cm2  · Trace paravalvular leak beneath the MV leaflet.  · Trace to mild mitral valve regurgitation is present  · Mild tricuspid valve regurgitation is present.     Results for orders placed during the hospital encounter of 08/13/21    Duplex Carotid Ultrasound CAR    Interpretation Summary  · Mild heterogeneous carotid atherosclerosis bilaterally.  · Right internal carotid artery stenosis of 0-49%.  · Left internal carotid artery stenosis of 0-49%.      Assessment / Plan      Assessment / Plan:  Diagnoses and all orders for this visit:    1. S/P TAVR (transcatheter aortic valve replacement) (Primary)       1. S/P TAVR with Shah's SUSHANT 3 valve on 10/18/2021 with Dr. Major: Patient with improvement in overall dizziness, presyncopal symptoms, much less frequently than before. They still seem to be associated with position changes. Reviewed his most recent TTE which shows EF around 60% and proper positioning of aortic valve. Patient reports improvement in fatigue, participating in cardiac rehab. Patient is progressing well from postoperative standpoint. He has decreased his diabetes medicine and I encouraged him to follow with his pcp for close management  of this and for further management of symptoms as he could need other neurologic source evaluation if symptoms continue. Will plan to see the patient back on an as needed basis.     Patient Education:  Do to your heart valve disease, you will require antibiotics prior to any dental procedure, for life, to reduce your risk of a heart infection called endocarditis. Card given.    Follow Up:   Return if symptoms worsen or fail to improve.   Or sooner for any further concerns or worsening sign and symptoms. If unable to reach us in the office please dial 911 or go to the nearest emergency department.      Maia BRAY  [facility] Cardiothoracic Surgery    Time Spent: I spent 25 minutes caring for Mitali on this date of service. This time includes time spent by me in the following activities: preparing for the visit, reviewing tests, obtaining and/or reviewing a separately obtained history, performing a medically appropriate examination and/or evaluation, counseling and educating the patient/family/caregiver, documenting information in the medical record and independently interpreting results and communicating that information with the patient/family/caregiver.

## 2021-12-29 ENCOUNTER — TREATMENT (OUTPATIENT)
Dept: CARDIAC REHAB | Facility: HOSPITAL | Age: 71
End: 2021-12-29

## 2021-12-29 DIAGNOSIS — Z95.2 S/P TAVR (TRANSCATHETER AORTIC VALVE REPLACEMENT): Primary | ICD-10-CM

## 2021-12-29 PROCEDURE — 93798 PHYS/QHP OP CAR RHAB W/ECG: CPT

## 2022-01-03 ENCOUNTER — TREATMENT (OUTPATIENT)
Dept: CARDIAC REHAB | Facility: HOSPITAL | Age: 72
End: 2022-01-03

## 2022-01-03 DIAGNOSIS — Z95.2 S/P TAVR (TRANSCATHETER AORTIC VALVE REPLACEMENT): Primary | ICD-10-CM

## 2022-01-03 PROCEDURE — 93798 PHYS/QHP OP CAR RHAB W/ECG: CPT

## 2022-01-05 ENCOUNTER — TREATMENT (OUTPATIENT)
Dept: CARDIAC REHAB | Facility: HOSPITAL | Age: 72
End: 2022-01-05

## 2022-01-05 DIAGNOSIS — Z95.2 S/P TAVR (TRANSCATHETER AORTIC VALVE REPLACEMENT): Primary | ICD-10-CM

## 2022-01-05 PROCEDURE — 93798 PHYS/QHP OP CAR RHAB W/ECG: CPT

## 2022-01-10 ENCOUNTER — TREATMENT (OUTPATIENT)
Dept: CARDIAC REHAB | Facility: HOSPITAL | Age: 72
End: 2022-01-10

## 2022-01-10 DIAGNOSIS — Z95.2 S/P TAVR (TRANSCATHETER AORTIC VALVE REPLACEMENT): Primary | ICD-10-CM

## 2022-01-10 PROCEDURE — 93798 PHYS/QHP OP CAR RHAB W/ECG: CPT

## 2022-01-12 ENCOUNTER — TREATMENT (OUTPATIENT)
Dept: CARDIAC REHAB | Facility: HOSPITAL | Age: 72
End: 2022-01-12

## 2022-01-12 DIAGNOSIS — Z95.2 S/P TAVR (TRANSCATHETER AORTIC VALVE REPLACEMENT): Primary | ICD-10-CM

## 2022-01-12 PROCEDURE — 93798 PHYS/QHP OP CAR RHAB W/ECG: CPT

## 2022-01-12 RX ORDER — PRAZOSIN HYDROCHLORIDE 5 MG/1
CAPSULE ORAL
Qty: 360 CAPSULE | Refills: 3 | Status: SHIPPED | OUTPATIENT
Start: 2022-01-12

## 2022-01-12 NOTE — TELEPHONE ENCOUNTER
BP still running elevated- 140-170 systolic-     He is taking Losartan 100 mg daily, HCTZ 25 mg Daily and prazosin 5 mg BID    Will have him to increase Prazosin to 10 mg BID and he will update me next week- he is participating in cardiac rehab here at [facility]- spoke with Henrik and she relayed the instructions to the pt.

## 2022-01-14 ENCOUNTER — TREATMENT (OUTPATIENT)
Dept: CARDIAC REHAB | Facility: HOSPITAL | Age: 72
End: 2022-01-14

## 2022-01-14 DIAGNOSIS — Z95.2 S/P TAVR (TRANSCATHETER AORTIC VALVE REPLACEMENT): Primary | ICD-10-CM

## 2022-01-14 PROCEDURE — 93798 PHYS/QHP OP CAR RHAB W/ECG: CPT

## 2022-01-14 NOTE — PATIENT INSTRUCTIONS
The American Heart Association recommends 150 minutes of aerobic exercise per week above and beyond your activities of daily living.  Your target heart rate is .  Your maximum heart rate for exercise is 151 and should not be sustained for long periods of time.  These numbers are based on age and should be evaluated yearly.      Exercise Guidelines   When you are recovering from a heart condition or surgery, it is important to have heart-healthy habits, including exercise routines. Discuss an appropriate exercise program with your heart specialist (cardiologist) and rehabilitation therapist.  The program should meet your specific abilities and needs. Walking, biking, jogging, and swimming are all good aerobic activities and take light to moderate effort. Aerobic activities cause your heart to beat faster. Adding some light resistance training is also good for you. Even simple lifestyle changes can help. These lifestyle changes may include parking farther from the store or taking the stairs instead of the elevator.  At first, you may begin exercising under supervision, such as at a hospital or clinic. Over time, you may begin exercising at home if your health care provider approves.  Types of exercise  Below are types of exercises that are an important part of cardiac rehabilitation. Follow your health care provider's instructions on what types of exercises are good for you and your heart.  Aerobic exercise  Aerobic exercise keeps joints and muscles moving and is important to keep your heart healthy. It involves large muscle groups and improves blood flow (circulation) and endurance. It is also rhythmic and must be done for a longer period of time. Examples of aerobic exercise include:  · Swimming.  · Walking.  · Hiking.  · Jogging.  · Cross-country skiing.  · Dancing.  · Biking.    Static exercise  Static exercise (isometric exercise) uses muscles at high intensities without moving the joints. Some examples of  static exercise include pushing against a heavy couch that does not move, doing a wall sit, or holding a plank position. Static exercise improves strength but also quickly increases blood pressure. Follow these guidelines:  · If you have circulation problems or high blood pressure, talk with your health care provider before starting any static exercise routines. Do not do static exercises if your health care provider tells you not to.  · Do not hold your breath while doing static exercises. Holding your breath during static exercises can raise your blood pressure to a dangerously high level.    Weight-resistance exercise  Weight-resistance exercises are another important part of rehabilitation. These exercises strengthen your muscles by making them work against resistance. Resistance exercises may help you return to activities of daily living sooner and improve your quality of life. They also help reduce cardiac risk factors. Examples of weight-resistance exercise include using:  · Free weights.  · Weight-lifting machines.  · Large, specially designed rubber bands.  You will usually do weight-resistance exercises 2 times a week, with a 2-day rest period between workouts.  Stretching  Stretching before you exercise warms up your muscles and prevents injury. Stretching also improves your flexibility, balance, coordination, and range of motion. Follow these guidelines:  · Stretch before and after exercising.  · Do not force a muscle or joint into a painful angle. Stretching should be a relaxing part of your exercise routine.  · When you feel resistance in your muscle, hold the stretch for a few seconds. Make sure you keep breathing while you hold the stretch.  · Go slowly when doing all stretches.  Setting a pace  · Choose a pace that is comfortable for you.  ? You should be able to talk while exercising. If you are short of breath or unable to speak while you exercise, slow down.  ? If you can sing while exercising,  you are not exercising hard enough.  · Keep track of how hard you are working as you exercise (exertion level). Your rehabilitation therapist can teach you to use a mental scale to measure your level of exertion (perceived exertion). Using a mental scale, you will think about your exertion level and rate it in a range from 6 to 20.  ? A rating of 6 to 10. This means that you are doing very light exercise and are not exerting yourself enough. For a healthy person, this may be walking at a slow pace.  ? A rating of 11 to 15. This is exercise that is somewhat hard. For a healthy exercise session, you should aim for an exertion rate that is within this range.  ? A rating of 16 to 18. This is considered very hard or strenuous. For a healthy person, exercise at this rating may start to feel heavy and difficult.  ? A rating of 19 or 20. This means that you are working extremely hard. For most people, these numbers represent the hardest you have ever worked to exercise.  · Your health care provider or cardiac rehabilitation specialist may also recommend that you wear a heart rate monitor while you exercise. This will help keep track of your heart rate zones and how hard your heart is working.  Frequency  As you are recovering, it is important to start exercising slowly and to gradually work up to your goal. Work with your health care provider to set up an exercise routine that works for you. Generally, cardiac rehabilitation exercise should include:  · 40 minutes of aerobic activity 3-4 days a week.  · Stretching and strength exercises 2-3 days a week.  Contact a health care provider if:  · You have any of the following symptoms while exercising:  ? Pain, pressure, or burning in your chest, jaw, shoulder, or back (angina).  ? Feeling light-headed or dizzy.  ? Irregular or fast heartbeats (palpitations).  ? Shortness of breath.  · You are extremely tired after exercising.  Get help right away if you:  · Have angina that  "lasts for longer than 5 minutes and medicine does not help.  · Have severe chest discomfort, especially if the pain is crushing or pressure-like and spreads to your arms, back, neck, or jaw. Do not wait to see if the pain will go away.  · Have weakness or numbness in one or both legs.  · Are confused.  · Have trouble breathing or shortness of breath.  · Have excessive sweating that is not caused by exercise.  · Have any symptoms of a stroke. \"BE FAST\" is an easy way to remember the main warning signs of a stroke:  ? B - Balance. Signs are dizziness, sudden trouble walking, or loss of balance.  ? E - Eyes. Signs are trouble seeing or a sudden change in vision.  ? F - Face. Signs are sudden weakness or numbness of the face, or the face or eyelid drooping on one side.  ? A - Arms. Signs are weakness or numbness in an arm. This happens suddenly and usually on one side of the body.  ? S - Speech. Signs are sudden trouble speaking, slurred speech, or trouble understanding what people say.  ? T - Time. Time to call emergency services. Write down what time symptoms started.  · Have other signs of a stroke, such as:  ? A sudden, severe headache with no known cause.  ? Nausea or vomiting.  ? Seizure.  These symptoms may represent a serious problem that is an emergency. Do not wait to see if the symptoms will go away. Get medical help right away. Call your local emergency services (911 in the [place] Do not drive yourself to the hospital.  Summary  · When you are recovering from a heart condition, it is important to have heart-healthy habits, including exercise routines.  · At first, you may begin exercising under supervision, such as at a hospital or clinic. Over time, you may begin exercising at home if your health care provider approves.  · Choose a pace that is comfortable for you. You should be able to talk while exercising.  · Aim for 40 minutes of aerobic exercises 3-4 days a week.  · Aim to do stretching and strength " "exercises 2-3 days a week.  This information is not intended to replace advice given to you by your health care provider. Make sure you discuss any questions you have with your health care provider.  Document Revised: 09/09/2020 Document Reviewed: 09/09/2020  [employer] Patient Education © 2021 [employer]    https://www.nhlbi.nih.gov/files/docs/public/heart/dash_brief.pdf\">   DASH Eating Plan  DASH stands for Dietary Approaches to Stop Hypertension. The DASH eating plan is a healthy eating plan that has been shown to:  · Reduce high blood pressure (hypertension).  · Reduce your risk for type 2 diabetes, heart disease, and stroke.  · Help with weight loss.  What are tips for following this plan?  Reading food labels  · Check food labels for the amount of salt (sodium) per serving. Choose foods with less than 5 percent of the Daily Value of sodium. Generally, foods with less than 300 milligrams (mg) of sodium per serving fit into this eating plan.  · To find whole grains, look for the word \"whole\" as the first word in the ingredient list.  Shopping  · Buy products labeled as \"low-sodium\" or \"no salt added.\"  · Buy fresh foods. Avoid canned foods and pre-made or frozen meals.  Cooking  · Avoid adding salt when cooking. Use salt-free seasonings or herbs instead of table salt or sea salt. Check with your health care provider or pharmacist before using salt substitutes.  · Do not hurd foods. Cook foods using healthy methods such as baking, boiling, grilling, roasting, and broiling instead.  · Cook with heart-healthy oils, such as olive, canola, avocado, soybean, or sunflower oil.  Meal planning    · Eat a balanced diet that includes:  ? 4 or more servings of fruits and 4 or more servings of vegetables each day. Try to fill one-half of your plate with fruits and vegetables.  ? 6-8 servings of whole grains each day.  ? Less than 6 oz (170 g) of lean meat, poultry, or fish each day. A 3-oz (85-g) serving of meat is about the " same size as a deck of cards. One egg equals 1 oz (28 g).  ? 2-3 servings of low-fat dairy each day. One serving is 1 cup (237 mL).  ? 1 serving of nuts, seeds, or beans 5 times each week.  ? 2-3 servings of heart-healthy fats. Healthy fats called omega-3 fatty acids are found in foods such as walnuts, flaxseeds, fortified milks, and eggs. These fats are also found in cold-water fish, such as sardines, salmon, and mackerel.  · Limit how much you eat of:  ? Canned or prepackaged foods.  ? Food that is high in trans fat, such as some fried foods.  ? Food that is high in saturated fat, such as fatty meat.  ? Desserts and other sweets, sugary drinks, and other foods with added sugar.  ? Full-fat dairy products.  · Do not salt foods before eating.  · Do not eat more than 4 egg yolks a week.  · Try to eat at least 2 vegetarian meals a week.  · Eat more home-cooked food and less restaurant, buffet, and fast food.    Lifestyle  · When eating at a restaurant, ask that your food be prepared with less salt or no salt, if possible.  · If you drink alcohol:  ? Limit how much you use to:  § 0-1 drink a day for women who are not pregnant.  § 0-2 drinks a day for men.  ? Be aware of how much alcohol is in your drink. In the [place] one drink equals one 12 oz bottle of beer (355 mL), one 5 oz glass of wine (148 mL), or one 1½ oz glass of hard liquor (44 mL).  General information  · Avoid eating more than 2,300 mg of salt a day. If you have hypertension, you may need to reduce your sodium intake to 1,500 mg a day.  · Work with your health care provider to maintain a healthy body weight or to lose weight. Ask what an ideal weight is for you.  · Get at least 30 minutes of exercise that causes your heart to beat faster (aerobic exercise) most days of the week. Activities may include walking, swimming, or biking.  · Work with your health care provider or dietitian to adjust your eating plan to your individual calorie needs.  What foods  should I eat?  Fruits  All fresh, dried, or frozen fruit. Canned fruit in natural juice (without added sugar).  Vegetables  Fresh or frozen vegetables (raw, steamed, roasted, or grilled). Low-sodium or reduced-sodium tomato and vegetable juice. Low-sodium or reduced-sodium tomato sauce and tomato paste. Low-sodium or reduced-sodium canned vegetables.  Grains  Whole-grain or whole-wheat bread. Whole-grain or whole-wheat pasta. Brown rice. Oatmeal. Quinoa. Bulgur. Whole-grain and low-sodium cereals. Gilma bread. Low-fat, low-sodium crackers. Whole-wheat flour tortillas.  Meats and other proteins  Skinless chicken or turkey. Ground chicken or turkey. Pork with fat trimmed off. Fish and seafood. Egg whites. Dried beans, peas, or lentils. Unsalted nuts, nut butters, and seeds. Unsalted canned beans. Lean cuts of beef with fat trimmed off. Low-sodium, lean precooked or cured meat, such as sausages or meat loaves.  Dairy  Low-fat (1%) or fat-free (skim) milk. Reduced-fat, low-fat, or fat-free cheeses. Nonfat, low-sodium ricotta or cottage cheese. Low-fat or nonfat yogurt. Low-fat, low-sodium cheese.  Fats and oils  Soft margarine without trans fats. Vegetable oil. Reduced-fat, low-fat, or light mayonnaise and salad dressings (reduced-sodium). Canola, safflower, olive, avocado, soybean, and sunflower oils. Avocado.  Seasonings and condiments  Herbs. Spices. Seasoning mixes without salt.  Other foods  Unsalted popcorn and pretzels. Fat-free sweets.  The items listed above may not be a complete list of foods and beverages you can eat. Contact a dietitian for more information.  What foods should I avoid?  Fruits  Canned fruit in a light or heavy syrup. Fried fruit. Fruit in cream or butter sauce.  Vegetables  Creamed or fried vegetables. Vegetables in a cheese sauce. Regular canned vegetables (not low-sodium or reduced-sodium). Regular canned tomato sauce and paste (not low-sodium or reduced-sodium). Regular tomato and  vegetable juice (not low-sodium or reduced-sodium). Pickles. Olives.  Grains  Baked goods made with fat, such as croissants, muffins, or some breads. Dry pasta or rice meal packs.  Meats and other proteins  Fatty cuts of meat. Ribs. Fried meat. Whiteside. Bologna, salami, and other precooked or cured meats, such as sausages or meat loaves. Fat from the back of a pig (fatback). Bratwurst. Salted nuts and seeds. Canned beans with added salt. Canned or smoked fish. Whole eggs or egg yolks. Chicken or turkey with skin.  Dairy  Whole or 2% milk, cream, and half-and-half. Whole or full-fat cream cheese. Whole-fat or sweetened yogurt. Full-fat cheese. Nondairy creamers. Whipped toppings. Processed cheese and cheese spreads.  Fats and oils  Butter. Stick margarine. Lard. Shortening. Ghee. Whiteside fat. Tropical oils, such as coconut, palm kernel, or palm oil.  Seasonings and condiments  Onion salt, garlic salt, seasoned salt, table salt, and sea salt. Worcestershire sauce. Tartar sauce. Barbecue sauce. Teriyaki sauce. Soy sauce, including reduced-sodium. Steak sauce. Canned and packaged gravies. Fish sauce. Oyster sauce. Cocktail sauce. Store-bought horseradish. Ketchup. Mustard. Meat flavorings and tenderizers. Bouillon cubes. Hot sauces. Pre-made or packaged marinades. Pre-made or packaged taco seasonings. Relishes. Regular salad dressings.  Other foods  Salted popcorn and pretzels.  The items listed above may not be a complete list of foods and beverages you should avoid. Contact a dietitian for more information.  Where to find more information  · National Heart, Lung, and Blood [place]: www.nhlbi.nih.gov  · American Heart Association: www.heart.org  · Academy of Nutrition and Dietetics: www.eatright.org  · National Kidney Foundation: www.kidney.org  Summary  · The DASH eating plan is a healthy eating plan that has been shown to reduce high blood pressure (hypertension). It may also reduce your risk for type 2 diabetes, heart  disease, and stroke.  · When on the DASH eating plan, aim to eat more fresh fruits and vegetables, whole grains, lean proteins, low-fat dairy, and heart-healthy fats.  · With the DASH eating plan, you should limit salt (sodium) intake to 2,300 mg a day. If you have hypertension, you may need to reduce your sodium intake to 1,500 mg a day.  · Work with your health care provider or dietitian to adjust your eating plan to your individual calorie needs.  This information is not intended to replace advice given to you by your health care provider. Make sure you discuss any questions you have with your health care provider.  Document Revised: 11/20/2020 Document Reviewed: 11/20/2020  [employer]vier Patient Education © 2021 [employer]

## 2022-01-17 ENCOUNTER — APPOINTMENT (OUTPATIENT)
Dept: CARDIAC REHAB | Facility: HOSPITAL | Age: 72
End: 2022-01-17

## 2022-01-17 PROCEDURE — 93228 REMOTE 30 DAY ECG REV/REPORT: CPT | Performed by: INTERNAL MEDICINE

## 2022-01-19 ENCOUNTER — APPOINTMENT (OUTPATIENT)
Dept: CARDIAC REHAB | Facility: HOSPITAL | Age: 72
End: 2022-01-19

## 2022-01-21 ENCOUNTER — APPOINTMENT (OUTPATIENT)
Dept: CARDIAC REHAB | Facility: HOSPITAL | Age: 72
End: 2022-01-21

## 2022-01-24 ENCOUNTER — APPOINTMENT (OUTPATIENT)
Dept: CARDIAC REHAB | Facility: HOSPITAL | Age: 72
End: 2022-01-24

## 2022-01-26 ENCOUNTER — APPOINTMENT (OUTPATIENT)
Dept: CARDIAC REHAB | Facility: HOSPITAL | Age: 72
End: 2022-01-26

## 2022-01-28 ENCOUNTER — APPOINTMENT (OUTPATIENT)
Dept: CARDIAC REHAB | Facility: HOSPITAL | Age: 72
End: 2022-01-28

## 2022-01-31 ENCOUNTER — APPOINTMENT (OUTPATIENT)
Dept: CARDIAC REHAB | Facility: HOSPITAL | Age: 72
End: 2022-01-31

## 2022-02-02 ENCOUNTER — APPOINTMENT (OUTPATIENT)
Dept: CARDIAC REHAB | Facility: HOSPITAL | Age: 72
End: 2022-02-02

## 2022-02-04 ENCOUNTER — APPOINTMENT (OUTPATIENT)
Dept: CARDIAC REHAB | Facility: HOSPITAL | Age: 72
End: 2022-02-04

## 2022-02-07 ENCOUNTER — APPOINTMENT (OUTPATIENT)
Dept: CARDIAC REHAB | Facility: HOSPITAL | Age: 72
End: 2022-02-07

## 2022-02-09 ENCOUNTER — APPOINTMENT (OUTPATIENT)
Dept: CARDIAC REHAB | Facility: HOSPITAL | Age: 72
End: 2022-02-09

## 2022-02-11 ENCOUNTER — APPOINTMENT (OUTPATIENT)
Dept: CARDIAC REHAB | Facility: HOSPITAL | Age: 72
End: 2022-02-11

## 2022-02-14 ENCOUNTER — APPOINTMENT (OUTPATIENT)
Dept: CARDIAC REHAB | Facility: HOSPITAL | Age: 72
End: 2022-02-14

## 2022-02-16 ENCOUNTER — APPOINTMENT (OUTPATIENT)
Dept: CARDIAC REHAB | Facility: HOSPITAL | Age: 72
End: 2022-02-16

## 2022-02-18 ENCOUNTER — APPOINTMENT (OUTPATIENT)
Dept: CARDIAC REHAB | Facility: HOSPITAL | Age: 72
End: 2022-02-18

## 2022-02-21 ENCOUNTER — APPOINTMENT (OUTPATIENT)
Dept: CARDIAC REHAB | Facility: HOSPITAL | Age: 72
End: 2022-02-21

## 2022-02-23 ENCOUNTER — APPOINTMENT (OUTPATIENT)
Dept: CARDIAC REHAB | Facility: HOSPITAL | Age: 72
End: 2022-02-23

## 2022-02-25 ENCOUNTER — APPOINTMENT (OUTPATIENT)
Dept: CARDIAC REHAB | Facility: HOSPITAL | Age: 72
End: 2022-02-25

## 2022-02-28 ENCOUNTER — APPOINTMENT (OUTPATIENT)
Dept: CARDIAC REHAB | Facility: HOSPITAL | Age: 72
End: 2022-02-28

## 2022-03-02 ENCOUNTER — APPOINTMENT (OUTPATIENT)
Dept: CARDIAC REHAB | Facility: HOSPITAL | Age: 72
End: 2022-03-02

## 2022-03-04 ENCOUNTER — APPOINTMENT (OUTPATIENT)
Dept: CARDIAC REHAB | Facility: HOSPITAL | Age: 72
End: 2022-03-04

## 2022-03-07 ENCOUNTER — APPOINTMENT (OUTPATIENT)
Dept: CARDIAC REHAB | Facility: HOSPITAL | Age: 72
End: 2022-03-07

## 2022-03-09 ENCOUNTER — APPOINTMENT (OUTPATIENT)
Dept: CARDIAC REHAB | Facility: HOSPITAL | Age: 72
End: 2022-03-09

## 2022-03-11 ENCOUNTER — APPOINTMENT (OUTPATIENT)
Dept: CARDIAC REHAB | Facility: HOSPITAL | Age: 72
End: 2022-03-11

## 2022-03-14 ENCOUNTER — APPOINTMENT (OUTPATIENT)
Dept: CARDIAC REHAB | Facility: HOSPITAL | Age: 72
End: 2022-03-14

## 2022-05-26 ENCOUNTER — OFFICE VISIT (OUTPATIENT)
Dept: CARDIOLOGY | Facility: CLINIC | Age: 72
End: 2022-05-26

## 2022-05-26 VITALS
BODY MASS INDEX: 33.27 KG/M2 | WEIGHT: 251 LBS | OXYGEN SATURATION: 96 % | HEART RATE: 67 BPM | HEIGHT: 73 IN | SYSTOLIC BLOOD PRESSURE: 152 MMHG | DIASTOLIC BLOOD PRESSURE: 62 MMHG

## 2022-05-26 DIAGNOSIS — E78.5 DYSLIPIDEMIA: Chronic | ICD-10-CM

## 2022-05-26 DIAGNOSIS — I35.0 AORTIC STENOSIS, SEVERE: Primary | ICD-10-CM

## 2022-05-26 DIAGNOSIS — I10 PRIMARY HYPERTENSION: Chronic | ICD-10-CM

## 2022-05-26 PROCEDURE — 99214 OFFICE O/P EST MOD 30 MIN: CPT | Performed by: NURSE PRACTITIONER

## 2022-05-26 RX ORDER — OLMESARTAN MEDOXOMIL 40 MG/1
TABLET ORAL DAILY
COMMUNITY
Start: 2022-04-08

## 2022-05-26 NOTE — PROGRESS NOTES
[facility]    Patient ID: Mitali Cruz is a 71 y.o. male.  : 1950   Contact: [phone]    Encounter date: 2021    PCP: Nirmal Fuentes MD      Chief complaint:   Chief Complaint   Patient presents with   • Severe AS      PROBLEM LIST:  1. Aortic stenosis  a. Nulcear stress test , PW: positive thallium GXT for ischemia in inferior area. Normal EF  b. [facility] 2009: normal coronaries, false positive cardiolite GXT.   c. Echo 21, S: EF 60-65%, moderate to severe AS (Ao max PG 78.1 mmHg, Ao mean PG 46 mmHg, Ao V2 VTI 85.8 cm, JAMES 1.1cm2), RVSP < 35 mmHg.    d. CHRISTOPHER 21: EF 55%, moderate LVH, severe AS, aortic annulus 2.4 cm2, mild MR, mild TR.   e. [facility] 21: minor CAD, severe AS.   f. S/p TAVR using a 26 mm SUSHANT 3 pericardial prosthesis, 10/18/21, [facility].   g. Post op CHRISTOPHER: Ao max PG 13 mmHg and mean PG 6 mmHg.   h. Echo 21: EF 60%, grade I diastolic dysfunction, 26 mm tissue Shah SUSHANT 3 valve in the aortic position. Mean gradient 11 mmHg. JAMES 1.6cm2. Trace paravalvular leak beneath the MV leaflet, trace to mild MR. Mild TR.   2. Dizziness  a. 30-day event monitor pending  3. Hypertension  4. Type II diabetes  5. Prostate cancer  6. Hyperlipidemia  a. FLP 3/3/21: , Trig 681, LDL not calculated, HDL 21.9.   b. CMP 3/3/21: AST 31, ALT 31, creatinine 1.4  7. ALONZO  8. Adrenal tumor  9. BPH    Allergies and Medications:  Allergies   Allergen Reactions   • Lisinopril Cough   • Metformin GI Intolerance   • Nifedipine Swelling   • Singulair [Montelukast] Hives       Current Medications:    Current Outpatient Medications:   •  aspirin 81 MG EC tablet, Take 81 mg by mouth Daily., Disp: , Rfl:   •  atorvastatin (LIPITOR) 20 MG tablet, Take 20 mg by mouth Daily., Disp: , Rfl:   •  dapagliflozin Propanediol 10 MG tablet, Take 10 mg by mouth Daily., Disp: , Rfl:   •  fenofibrate 160 MG tablet, Take 160 mg by mouth Daily., Disp: , Rfl:   •   "guaiFENesin 200 MG tablet, Take 400 mg by mouth Every 4 (Four) Hours As Needed for Cough., Disp: , Rfl:   •  hydroCHLOROthiazide (HYDRODIURIL) 25 MG tablet, Take 25 mg by mouth Daily., Disp: , Rfl:   •  Insulin Aspart (NOVOLOG FLEXPEN SC), 30 Units 3 (Three) Times a Day. Sliding, Disp: , Rfl:   •  Insulin Degludec (TRESIBA FLEXTOUCH SC), 70 Units Daily., Disp: , Rfl:   •  olmesartan (BENICAR) 40 MG tablet, Daily., Disp: , Rfl:   •  Ozempic, 0.25 or 0.5 MG/DOSE, 2 MG/1.5ML solution pen-injector, 0.25 mg 1 (One) Time Per Week., Disp: , Rfl:   •  prazosin (MINIPRESS) 5 MG capsule, 2 tablets PO every 12 hours (Patient taking differently: 5 mg Daily.), Disp: 360 capsule, Rfl: 3  •  traMADol (ULTRAM) 50 MG tablet, Take 50 mg by mouth Every 6 (Six) Hours As Needed., Disp: , Rfl:     HPI    Mitali Cruz is a 71 y.o. male who presents today for follow up on aortic stenosis, s/p TAVR, HTN, HLD. Since last visit, patient has been doing well. No chest pain, pnd, orthopnea. He has had trace edema. One episode of getting very fatigued. This was brief and has not reoccurred. He reports that his SBP has been < 140.      The following portions of the patient's history were reviewed and updated as appropriate: allergies, current medications and problem list.    Pertinent positives as listed in the HPI.  All other systems reviewed are negative.         Vitals:    05/26/22 1521   BP: 152/62   BP Location: Left arm   Patient Position: Sitting   Pulse: 67   SpO2: 96%   Weight: 114 kg (251 lb)   Height: 185.4 cm (73\")       Physical Exam:  General: Alert and oriented.  Neck: Jugular venous pressure is within normal limits. Carotids have normal upstrokes without bruits.   Cardiovascular: Heart has a nondisplaced focal PMI. Regular rate and rhythm without murmur, gallop or rub.  Lungs: Clear without rales or wheezes. Equal expansion is noted.   Extremities: Show no edema.  Skin: Warm and dry.  Neurologic: Nonfocal.     Diagnostic " Data:  Lab Results   Component Value Date    GLUCOSE 130 (H) 11/10/2021    BUN 35 (H) 05/11/2022    CREATININE 1.69 (H) 05/11/2022    EGFRIFNONA 40 (L) 05/11/2022    EGFRIFAFRI 49 (L) 05/11/2022    BCR 21 05/11/2022    K 4.5 05/11/2022    CO2 24 05/11/2022    CALCIUM 9.9 05/11/2022    ALBUMIN 4.2 05/11/2022    AST 27 10/15/2021    ALT 25 10/15/2021     Lab Results   Component Value Date    GLUCOSE 130 (H) 11/10/2021    CALCIUM 9.9 05/11/2022     05/11/2022    K 4.5 05/11/2022    CO2 24 05/11/2022     05/11/2022    BUN 35 (H) 05/11/2022    CREATININE 1.69 (H) 05/11/2022    EGFRIFAFRI 49 (L) 05/11/2022    EGFRIFNONA 40 (L) 05/11/2022    BCR 21 05/11/2022    ANIONGAP 9 05/11/2022     Lab Results   Component Value Date    CHOL 123 08/13/2021    TRIG 182 (H) 08/13/2021    HDL 23 (L) 08/13/2021    LDL 69 08/13/2021     Lab Results   Component Value Date    WBC 7.69 11/10/2021    HGB 13.5 11/10/2021    HCT 42.1 11/10/2021    MCV 84.4 11/10/2021     11/10/2021     No results found for: TSH    Procedures     ASSESSMENT:    ICD-10-CM ICD-9-CM   1. Severe AS   I35.0 424.1   2. Primary hypertension  I10 401.9   3. Dyslipidemia  E78.5 272.4     Lab results found above were reviewed with the patient.      PLAN:  1. Continue Prazosin, olmesartan, HCTZ for HTN. Call us, nephrology at  or PCP if running too high.   2. Repeat echo October 2022 for follow up on TAVR.   3. Continue ASA for history of aortic stenosis, s/p TAVR.  4. Continue all other current medications.  5. F/up in 12 months, sooner if needed.      Electronically signed by KATARINA Sue, 05/26/22, 3:10 PM EDT.

## 2022-10-12 ENCOUNTER — HOSPITAL ENCOUNTER (OUTPATIENT)
Dept: CARDIOLOGY | Facility: HOSPITAL | Age: 72
Discharge: HOME | End: 2022-10-12
Admitting: NURSE PRACTITIONER

## 2022-10-12 VITALS
HEIGHT: 73 IN | SYSTOLIC BLOOD PRESSURE: 154 MMHG | DIASTOLIC BLOOD PRESSURE: 76 MMHG | BODY MASS INDEX: 33.27 KG/M2 | WEIGHT: 251 LBS

## 2022-10-12 DIAGNOSIS — I35.0 AORTIC STENOSIS, SEVERE: ICD-10-CM

## 2022-10-12 PROCEDURE — 93306 TTE W/DOPPLER COMPLETE: CPT

## 2022-10-12 PROCEDURE — 93306 TTE W/DOPPLER COMPLETE: CPT | Performed by: INTERNAL MEDICINE

## 2022-10-13 LAB
BH CV ECHO MEAS - AO MAX PG: 29.1 MMHG
BH CV ECHO MEAS - AO MEAN PG: 14 MMHG
BH CV ECHO MEAS - AO ROOT DIAM: 3.4 CM
BH CV ECHO MEAS - AO V2 MAX: 269 CM/SEC
BH CV ECHO MEAS - AO V2 VTI: 59.5 CM
BH CV ECHO MEAS - AVA(I,D): 2 CM2
BH CV ECHO MEAS - EDV(CUBED): 117.6 ML
BH CV ECHO MEAS - EDV(MOD-SP2): 99.5 ML
BH CV ECHO MEAS - EDV(MOD-SP4): 108 ML
BH CV ECHO MEAS - EF(MOD-BP): 65.8 %
BH CV ECHO MEAS - EF(MOD-SP2): 63.8 %
BH CV ECHO MEAS - EF(MOD-SP4): 66.4 %
BH CV ECHO MEAS - ESV(CUBED): 18.2 ML
BH CV ECHO MEAS - ESV(MOD-SP2): 36 ML
BH CV ECHO MEAS - ESV(MOD-SP4): 36.3 ML
BH CV ECHO MEAS - FS: 46.3 %
BH CV ECHO MEAS - IVS/LVPW: 1.01 CM
BH CV ECHO MEAS - IVSD: 1.32 CM
BH CV ECHO MEAS - LA DIMENSION: 4.2 CM
BH CV ECHO MEAS - LAT PEAK E' VEL: 12.3 CM/SEC
BH CV ECHO MEAS - LV MASS(C)D: 256.9 GRAMS
BH CV ECHO MEAS - LV MAX PG: 7 MMHG
BH CV ECHO MEAS - LV MEAN PG: 4.3 MMHG
BH CV ECHO MEAS - LV V1 MAX: 131.9 CM/SEC
BH CV ECHO MEAS - LV V1 VTI: 36.2 CM
BH CV ECHO MEAS - LVIDD: 4.9 CM
BH CV ECHO MEAS - LVIDS: 2.6 CM
BH CV ECHO MEAS - LVOT AREA: 3.3 CM2
BH CV ECHO MEAS - LVOT DIAM: 2.05 CM
BH CV ECHO MEAS - LVPWD: 1.3 CM
BH CV ECHO MEAS - MED PEAK E' VEL: 7.5 CM/SEC
BH CV ECHO MEAS - MR MAX PG: 51.4 MMHG
BH CV ECHO MEAS - MR MAX VEL: 337.7 CM/SEC
BH CV ECHO MEAS - MV A MAX VEL: 80.1 CM/SEC
BH CV ECHO MEAS - MV DEC SLOPE: 340.2 CM/SEC2
BH CV ECHO MEAS - MV DEC TIME: 0.27 MSEC
BH CV ECHO MEAS - MV E MAX VEL: 89.1 CM/SEC
BH CV ECHO MEAS - MV E/A: 1.11
BH CV ECHO MEAS - MV P1/2T: 95.5 MSEC
BH CV ECHO MEAS - MVA(P1/2T): 2.3 CM2
BH CV ECHO MEAS - PA ACC TIME: 0.17 SEC
BH CV ECHO MEAS - PA PR(ACCEL): 4.5 MMHG
BH CV ECHO MEAS - PA V2 MAX: 116.7 CM/SEC
BH CV ECHO MEAS - PI END-D VEL: 48.9 CM/SEC
BH CV ECHO MEAS - RAP SYSTOLE: 3 MMHG
BH CV ECHO MEAS - RVSP: 37 MMHG
BH CV ECHO MEAS - SV(LVOT): 119.1 ML
BH CV ECHO MEAS - SV(MOD-SP2): 63.5 ML
BH CV ECHO MEAS - SV(MOD-SP4): 71.7 ML
BH CV ECHO MEAS - TAPSE (>1.6): 2.48 CM
BH CV ECHO MEAS - TR MAX PG: 29.7 MMHG
BH CV ECHO MEAS - TR MAX VEL: 272.3 CM/SEC
BH CV ECHO MEASUREMENTS AVERAGE E/E' RATIO: 9
BH CV XLRA - RV BASE: 4.4 CM
BH CV XLRA - RV LENGTH: 8.1 CM
BH CV XLRA - RV MID: 3.3 CM
BH CV XLRA - TDI S': 10.9 CM/SEC
LEFT ATRIUM VOLUME INDEX: 32.8 ML/M2
LV EF 2D ECHO EST: 65 %
MAXIMAL PREDICTED HEART RATE: 150 BPM
STRESS TARGET HR: 128 BPM

## 2023-05-31 ENCOUNTER — OFFICE VISIT (OUTPATIENT)
Dept: CARDIOLOGY | Facility: CLINIC | Age: 73
End: 2023-05-31
Payer: MEDICARE

## 2023-05-31 VITALS
BODY MASS INDEX: 33.13 KG/M2 | HEIGHT: 73 IN | OXYGEN SATURATION: 96 % | SYSTOLIC BLOOD PRESSURE: 136 MMHG | DIASTOLIC BLOOD PRESSURE: 60 MMHG | HEART RATE: 75 BPM | WEIGHT: 250 LBS

## 2023-05-31 DIAGNOSIS — I10 PRIMARY HYPERTENSION: ICD-10-CM

## 2023-05-31 DIAGNOSIS — Z95.3 S/P TAVR (TRANSCATHETER AORTIC VALVE REPLACEMENT), BIOPROSTHETIC: ICD-10-CM

## 2023-05-31 DIAGNOSIS — N18.30 STAGE 3 CHRONIC KIDNEY DISEASE, UNSPECIFIED WHETHER STAGE 3A OR 3B CKD: ICD-10-CM

## 2023-05-31 DIAGNOSIS — I35.0 AORTIC STENOSIS, SEVERE: Primary | ICD-10-CM

## 2023-05-31 DIAGNOSIS — R60.9 PERIPHERAL EDEMA: ICD-10-CM

## 2023-05-31 DIAGNOSIS — E78.2 ELEVATED CHOLESTEROL WITH HIGH TRIGLYCERIDES: ICD-10-CM

## 2023-05-31 PROBLEM — N18.31 STAGE 3A CHRONIC KIDNEY DISEASE: Status: ACTIVE | Noted: 2023-02-20

## 2023-05-31 PROBLEM — M79.662 PAIN OF LEFT CALF: Status: ACTIVE | Noted: 2022-09-27

## 2023-05-31 PROBLEM — G62.9 NEUROPATHY: Status: ACTIVE | Noted: 2022-05-20

## 2023-05-31 PROBLEM — H25.13 CATARACT, NUCLEAR SCLEROTIC, BOTH EYES: Status: ACTIVE | Noted: 2017-05-19

## 2023-05-31 PROBLEM — C61 MALIGNANT NEOPLASM OF PROSTATE: Status: ACTIVE | Noted: 2019-10-14

## 2023-05-31 PROBLEM — E11.9 TYPE 2 DIABETES MELLITUS WITHOUT RETINOPATHY: Status: RESOLVED | Noted: 2017-05-19 | Resolved: 2023-05-31

## 2023-05-31 PROBLEM — R97.20 RAISED PROSTATE SPECIFIC ANTIGEN: Status: RESOLVED | Noted: 2019-10-09 | Resolved: 2023-05-31

## 2023-05-31 PROBLEM — G89.29 CHRONIC RIGHT SHOULDER PAIN: Status: ACTIVE | Noted: 2022-10-05

## 2023-05-31 PROBLEM — H52.13 MYOPIA OF BOTH EYES: Status: ACTIVE | Noted: 2018-05-23

## 2023-05-31 PROBLEM — I34.0 MITRAL REGURGITATION: Status: ACTIVE | Noted: 2023-05-31

## 2023-05-31 PROBLEM — C61 MALIGNANT NEOPLASM OF PROSTATE: Status: RESOLVED | Noted: 2019-10-14 | Resolved: 2023-05-31

## 2023-05-31 PROBLEM — E11.9 TYPE 2 DIABETES MELLITUS WITHOUT RETINOPATHY: Status: ACTIVE | Noted: 2017-05-19

## 2023-05-31 PROBLEM — R20.0 NUMBNESS IN BOTH HANDS: Status: ACTIVE | Noted: 2017-09-01

## 2023-05-31 PROBLEM — N18.31 STAGE 3A CHRONIC KIDNEY DISEASE: Status: RESOLVED | Noted: 2023-02-20 | Resolved: 2023-05-31

## 2023-05-31 PROBLEM — R60.0 PERIPHERAL EDEMA: Status: ACTIVE | Noted: 2017-10-18

## 2023-05-31 PROBLEM — G56.00 CARPAL TUNNEL SYNDROME: Status: ACTIVE | Noted: 2017-12-11

## 2023-05-31 PROBLEM — H26.9 CATARACT, CORTICAL, BOTH EYES: Status: ACTIVE | Noted: 2017-05-19

## 2023-05-31 PROBLEM — R97.20 RAISED PROSTATE SPECIFIC ANTIGEN: Status: ACTIVE | Noted: 2019-10-09

## 2023-05-31 PROBLEM — M25.511 CHRONIC RIGHT SHOULDER PAIN: Status: ACTIVE | Noted: 2022-10-05

## 2023-05-31 PROBLEM — H52.4 BILATERAL PRESBYOPIA: Status: ACTIVE | Noted: 2018-05-23

## 2023-05-31 PROBLEM — H52.223 REGULAR ASTIGMATISM OF BOTH EYES: Status: ACTIVE | Noted: 2018-05-23

## 2023-05-31 PROBLEM — R01.1 HOLOSYSTOLIC MURMUR: Status: ACTIVE | Noted: 2017-10-18

## 2023-05-31 PROBLEM — H25.813 MIXED TYPE AGE-RELATED CATARACT, BOTH EYES: Status: ACTIVE | Noted: 2021-01-26

## 2023-05-31 PROBLEM — J32.9 CHRONIC SINUSITIS: Status: ACTIVE | Noted: 2017-02-16

## 2023-05-31 RX ORDER — HYDROCHLOROTHIAZIDE 12.5 MG/1
12.5 TABLET ORAL DAILY
COMMUNITY

## 2023-05-31 RX ORDER — PRAZOSIN HYDROCHLORIDE 5 MG/1
5 CAPSULE ORAL NIGHTLY
COMMUNITY

## 2023-05-31 NOTE — LETTER
2023     Nirmal Fuentes MD  [address]  [address]  [facility] [address]    Patient: Mitali Cruz   YOB: 1950   Date of Visit: 2023       Dear Dr. Gina MD:    Thank you for referring Mitali Cruz to me for evaluation. Below are the relevant portions of my assessment and plan of care.    If you have questions, please do not hesitate to call me. I look forward to following Mitali along with you.         Sincerely,        Kelli Singh MD        CC: No Recipients    Kelli Singh MD  [id number]  Signed  [facility]    Patient ID: Mitali Cruz is a 72 y.o. male.  : 1950   Contact: [phone]    Encounter date: 2023    PCP: Js Orozco MD      Chief complaint:   Chief Complaint   Patient presents with   • Follow-up     Aortic stenosis       Problem List:  Aortic stenosis s/p TAVR  Nulcear stress test , PW: positive thallium GXT for ischemia in inferior area. Normal EF  [facility] 2009: normal coronaries, false positive cardiolite GXT.   Echo 21, S: EF 60-65%, moderate to severe AS (Ao max PG 78.1 mmHg, Ao mean PG 46 mmHg, Ao V2 VTI 85.8 cm, JAMES 1.1cm2), RVSP < 35 mmHg.    CHRISTOPHER 21: EF 55%, moderate LVH, severe AS, aortic annulus 2.4 cm2, mild MR, mild TR.   [facility] 21: minor CAD, severe AS.   S/p TAVR using a 26 mm SUSHANT 3 pericardial prosthesis, 10/18/21, [facility].   Post op CHRISTOPHER: Ao max PG 13 mmHg and mean PG 6 mmHg.   Echo 21: EF 60%, grade I diastolic dysfunction, 26 mm tissue Shah SUSHANT 3 valve in the aortic position. Mean gradient 11 mmHg. JAMES 1.6cm2. Trace paravalvular leak beneath the MV leaflet, trace to mild MR. Mild TR.  Echocardiogram, 10/13/2022; EF 65%. Status post 26 mm SUSHANT 3 pericardial prosthesis in the aortic position. Aortic valve mean pressure gradient is 14 mmHg.  Trivial paravalvular leak beneath the anterior mitral valve leaflet.  Unchanged from previous  echo. Mild mitral and tricuspid regurgitation. RVSP from TR is 37 mmHg. No significant change from previous echocardiogram.  Dizziness  30 day MCOT, 01/10/2021; NSR and occasional PVCs. VT was asx. (two 4 beats runs and one 10 beat run.)  Hypertension  Type II diabetes  Prostate cancer  Hyperlipidemia  FLP 3/3/21: , Trig 681, LDL not calculated, HDL 21.9.   CMP 3/3/21: AST 31, ALT 31, creatinine 1.4  Chronic kidney disease, stage 3  ALONZO  Adrenal tumor  BPH    Allergies   Allergen Reactions   • Lisinopril Cough   • Metformin GI Intolerance   • Nifedipine Swelling   • Singulair [Montelukast] Hives       Current Medications:    Current Outpatient Medications:   •  aspirin 81 MG EC tablet, Take 1 tablet by mouth Daily., Disp: , Rfl:   •  dapagliflozin Propanediol 10 MG tablet, Take 10 mg by mouth Daily., Disp: , Rfl:   •  fenofibrate 160 MG tablet, Take 1 tablet by mouth Daily., Disp: , Rfl:   •  guaiFENesin 200 MG tablet, Take 2 tablets by mouth Every 4 (Four) Hours As Needed for Cough., Disp: , Rfl:   •  hydroCHLOROthiazide (HYDRODIURIL) 12.5 MG tablet, Take 1 tablet by mouth Daily., Disp: , Rfl:   •  Insulin Aspart (NOVOLOG FLEXPEN SC), 30 Units 3 (Three) Times a Day. Sliding, Disp: , Rfl:   •  Insulin Degludec (TRESIBA FLEXTOUCH SC), 70 Units Daily., Disp: , Rfl:   •  olmesartan (BENICAR) 40 MG tablet, Daily., Disp: , Rfl:   •  Ozempic, 0.25 or 0.5 MG/DOSE, 2 MG/1.5ML solution pen-injector, 0.25 mg 1 (One) Time Per Week., Disp: , Rfl:   •  prazosin (MINIPRESS) 5 MG capsule, Take 1 capsule by mouth Every Night., Disp: , Rfl:   •  Probiotic Product (PROBIOTIC ADVANCED PO), Take  by mouth Daily., Disp: , Rfl:   •  atorvastatin (LIPITOR) 20 MG tablet, Take 20 mg by mouth Daily. (Patient not taking: Reported on 5/31/2023), Disp: , Rfl:     EFE Cruz is a 72 y.o. male who presents today for an annual follow up of aortic stenosis s/p TAVR and cardiac risk factors. Since last visit, he reports that he  "stopped taking atorvastatin 1 month ago due to reported increased lethargy and myalgias. Since discontinuing the atorvastatin, he reports some improvement in his myalgias to the point where he can be more physically active. He now walks every other day around 0.5 mile only due to severe joint pain which debilitates him physically.     His lower extremity edema has remained well controlled on his diuretic medical therapy. He has been working with his nephrologist at  to adjust his blood pressure medications and protect his kidneys. Ultimately, his hydrochlorothiazide was decreased to 12.5 mg daily, he was switched from losartan to olmesartan 40 mg daily, and his prazosin was decreased to 5 mg at night.  His blood pressure now normally runs between 135-145 systolic and 65-75 diastolic. He denies chest pain, jaw/arm/neck pain, palpitations, shortness of breath,  lightheadedness, dizziness, presyncope, syncope, nausea, vomiting, diarrhea, abdominal discomfort, numbness or tingling.      The following portions of the patient's history were reviewed and updated as appropriate: allergies, current medications and problem list.    Pertinent positives as listed in the HPI.  All other systems reviewed are negative.         Vitals:    05/31/23 1531   BP: 136/60   BP Location: Left arm   Patient Position: Sitting   Pulse: 75   SpO2: 96%   Weight: 113 kg (250 lb)   Height: 185.4 cm (73\")       Physical Exam:  General: Alert and oriented.  Neck: Jugular venous pressure is within normal limits. Carotids have normal upstrokes without bruits.   Cardiovascular: Heart has a nondisplaced focal PMI. Regular rate and rhythm. No murmur, gallop or rub.  Lungs: Clear, no rales or wheezes. Equal expansion is noted.   Extremities: Mild LE edema bilaterally. Peripheral pulses palpable and equal.  Skin: Warm and dry.  Neurologic: Nonfocal.     Diagnostic Data (reviewed with patient):    No recent laboratory studies available for review today. "     Advance Care Planning   ACP discussion was declined by the patient. Patient does not have an advance directive, declines further assistance.         ECG 12 Lead    Date/Time: 5/31/2023 5:04 PM  Performed by: Elyse Srivastava PA-C  Authorized by: Elyse Srivastava PA-C   Comparison: compared with previous ECG from 10/19/2021  Similar to previous ECG  Rhythm: sinus rhythm  Rate: normal  BPM: 65  Other findings: T wave abnormality    Clinical impression: abnormal EKG              Assessment:    ICD-10-CM ICD-9-CM   1. Severe AS   I35.0 424.1   2. S/p TAVR (transcatheter aortic valve replacement), bioprosthetic  Z95.3 V42.2   3. Nonrheumatic mitral valve regurgitation  I34.0 424.0   4. Holosystolic murmur  R01.1 785.2   5. Primary hypertension  I10 401.9   6. Stage 3 chronic kidney disease, unspecified whether stage 3a or 3b CKD  N18.30 585.3   7. Elevated cholesterol with high triglycerides  E78.2 272.2   8. Peripheral edema  R60.9 782.3         Plan:  Limited echocardiogram prior to next scheduled f/u appointment to assess aortic valve, mitral valve and ejection fraction.  Encouraged to discuss a medical therapy for cholesterol control with his endocrinologist who follow his cholesterol.   Continue on aspirin 81 mg daily for antiplatelet therapy.    Continue fenofibrate 160 mg daily for hypertriglyceridemia.  Continue on prazosin 10 mg twice daily, hydrochlorothiazide 12.5 mg daily and olmesartan 40 mg daily for hypertension.  Continue all other current medications.  F/u in 3 months with limited echo prior, sooner if needed.      Elyse Srivastava PA-C functioning as a scribe for Kelli Singh MD, FACC.    I Kelli Singh MD personally performed the services described in this documentation as scribed by the above individual in my presence, and it is both accurate and complete.    Kelli Singh MD, FACC

## 2023-05-31 NOTE — PROGRESS NOTES
[facility]    Patient ID: Mitali Cruz is a 72 y.o. male.  : 1950   Contact: [phone]    Encounter date: 2023    PCP: Js Orozco MD      Chief complaint:   Chief Complaint   Patient presents with    Follow-up     Aortic stenosis       Problem List:  Aortic stenosis s/p TAVR  Nulcear stress test , [facility]: positive thallium GXT for ischemia in inferior area. Normal EF  [facility] 2009: normal coronaries, false positive cardiolite GXT.   Echo 21, S: EF 60-65%, moderate to severe AS (Ao max PG 78.1 mmHg, Ao mean PG 46 mmHg, Ao V2 VTI 85.8 cm, JAMES 1.1cm2), RVSP < 35 mmHg.    CHRISTOPHER 21: EF 55%, moderate LVH, severe AS, aortic annulus 2.4 cm2, mild MR, mild TR.   [facility] 21: minor CAD, severe AS.   S/p TAVR using a 26 mm SUSHANT 3 pericardial prosthesis, 10/18/21, [facility].   Post op CHRISTOPHER: Ao max PG 13 mmHg and mean PG 6 mmHg.   Echo 21: EF 60%, grade I diastolic dysfunction, 26 mm tissue Shah SUSHANT 3 valve in the aortic position. Mean gradient 11 mmHg. JAMES 1.6cm2. Trace paravalvular leak beneath the MV leaflet, trace to mild MR. Mild TR.  Echocardiogram, 10/13/2022; EF 65%. Status post 26 mm SUSHANT 3 pericardial prosthesis in the aortic position. Aortic valve mean pressure gradient is 14 mmHg.  Trivial paravalvular leak beneath the anterior mitral valve leaflet.  Unchanged from previous echo. Mild mitral and tricuspid regurgitation. RVSP from TR is 37 mmHg. No significant change from previous echocardiogram.  Dizziness  30 day MCOT, 01/10/2021; NSR and occasional PVCs. VT was asx. (two 4 beats runs and one 10 beat run.)  Hypertension  Type II diabetes  Prostate cancer  Hyperlipidemia  FLP 3/3/21: , Trig 681, LDL not calculated, HDL 21.9.   CMP 3/3/21: AST 31, ALT 31, creatinine 1.4  Chronic kidney disease, stage 3  ALONZO  Adrenal tumor  BPH    Allergies   Allergen Reactions    Lisinopril Cough    Metformin GI Intolerance    Nifedipine  Swelling    Singulair [Montelukast] Hives       Current Medications:    Current Outpatient Medications:     aspirin 81 MG EC tablet, Take 1 tablet by mouth Daily., Disp: , Rfl:     dapagliflozin Propanediol 10 MG tablet, Take 10 mg by mouth Daily., Disp: , Rfl:     fenofibrate 160 MG tablet, Take 1 tablet by mouth Daily., Disp: , Rfl:     guaiFENesin 200 MG tablet, Take 2 tablets by mouth Every 4 (Four) Hours As Needed for Cough., Disp: , Rfl:     hydroCHLOROthiazide (HYDRODIURIL) 12.5 MG tablet, Take 1 tablet by mouth Daily., Disp: , Rfl:     Insulin Aspart (NOVOLOG FLEXPEN SC), 30 Units 3 (Three) Times a Day. Sliding, Disp: , Rfl:     Insulin Degludec (TRESIBA FLEXTOUCH SC), 70 Units Daily., Disp: , Rfl:     olmesartan (BENICAR) 40 MG tablet, Daily., Disp: , Rfl:     Ozempic, 0.25 or 0.5 MG/DOSE, 2 MG/1.5ML solution pen-injector, 0.25 mg 1 (One) Time Per Week., Disp: , Rfl:     prazosin (MINIPRESS) 5 MG capsule, Take 1 capsule by mouth Every Night., Disp: , Rfl:     Probiotic Product (PROBIOTIC ADVANCED PO), Take  by mouth Daily., Disp: , Rfl:     atorvastatin (LIPITOR) 20 MG tablet, Take 20 mg by mouth Daily. (Patient not taking: Reported on 5/31/2023), Disp: , Rfl:     HPI    Mitali Cruz is a 72 y.o. male who presents today for an annual follow up of aortic stenosis s/p TAVR and cardiac risk factors. Since last visit, he reports that he stopped taking atorvastatin 1 month ago due to reported increased lethargy and myalgias. Since discontinuing the atorvastatin, he reports some improvement in his myalgias to the point where he can be more physically active. He now walks every other day around 0.5 mile only due to severe joint pain which debilitates him physically.     His lower extremity edema has remained well controlled on his diuretic medical therapy. He has been working with his nephrologist at  to adjust his blood pressure medications and protect his kidneys. Ultimately, his hydrochlorothiazide was  "decreased to 12.5 mg daily, he was switched from losartan to olmesartan 40 mg daily, and his prazosin was decreased to 5 mg at night.  His blood pressure now normally runs between 135-145 systolic and 65-75 diastolic. He denies chest pain, jaw/arm/neck pain, palpitations, shortness of breath,  lightheadedness, dizziness, presyncope, syncope, nausea, vomiting, diarrhea, abdominal discomfort, numbness or tingling.      The following portions of the patient's history were reviewed and updated as appropriate: allergies, current medications and problem list.    Pertinent positives as listed in the HPI.  All other systems reviewed are negative.         Vitals:    05/31/23 1531   BP: 136/60   BP Location: Left arm   Patient Position: Sitting   Pulse: 75   SpO2: 96%   Weight: 113 kg (250 lb)   Height: 185.4 cm (73\")       Physical Exam:  General: Alert and oriented.  Neck: Jugular venous pressure is within normal limits. Carotids have normal upstrokes without bruits.   Cardiovascular: Heart has a nondisplaced focal PMI. Regular rate and rhythm. No murmur, gallop or rub.  Lungs: Clear, no rales or wheezes. Equal expansion is noted.   Extremities: Mild LE edema bilaterally. Peripheral pulses palpable and equal.  Skin: Warm and dry.  Neurologic: Nonfocal.     Diagnostic Data (reviewed with patient):    No recent laboratory studies available for review today.     Advance Care Planning   ACP discussion was declined by the patient. Patient does not have an advance directive, declines further assistance.         ECG 12 Lead    Date/Time: 5/31/2023 5:04 PM  Performed by: Elyse Srivastava PA-C  Authorized by: Elyse Srivastava PA-C   Comparison: compared with previous ECG from 10/19/2021  Similar to previous ECG  Rhythm: sinus rhythm  Rate: normal  BPM: 65  Other findings: T wave abnormality    Clinical impression: abnormal EKG              Assessment:    ICD-10-CM ICD-9-CM   1. Severe AS   I35.0 424.1   2. S/p TAVR (transcatheter " aortic valve replacement), bioprosthetic  Z95.3 V42.2   3. Nonrheumatic mitral valve regurgitation  I34.0 424.0   4. Holosystolic murmur  R01.1 785.2   5. Primary hypertension  I10 401.9   6. Stage 3 chronic kidney disease, unspecified whether stage 3a or 3b CKD  N18.30 585.3   7. Elevated cholesterol with high triglycerides  E78.2 272.2   8. Peripheral edema  R60.9 782.3         Plan:  Limited echocardiogram prior to next scheduled f/u appointment to assess aortic valve, mitral valve and ejection fraction.  Encouraged to discuss a medical therapy for cholesterol control with his endocrinologist who follow his cholesterol.   Continue on aspirin 81 mg daily for antiplatelet therapy.    Continue fenofibrate 160 mg daily for hypertriglyceridemia.  Continue on prazosin 10 mg twice daily, hydrochlorothiazide 12.5 mg daily and olmesartan 40 mg daily for hypertension.  Continue all other current medications.  F/u in 3 months with limited echo prior, sooner if needed.      Elyse Srivastava PA-C functioning as a scribe for Kelli Singh MD, FACC.    I Kelli Singh MD personally performed the services described in this documentation as scribed by the above individual in my presence, and it is both accurate and complete.    Kelli Singh MD, FACC

## 2023-10-19 ENCOUNTER — HOSPITAL ENCOUNTER (OUTPATIENT)
Dept: CARDIOLOGY | Facility: HOSPITAL | Age: 73
Discharge: HOME | End: 2023-10-19
Payer: MEDICARE

## 2023-10-19 VITALS — HEIGHT: 73 IN | BODY MASS INDEX: 33.13 KG/M2 | WEIGHT: 250 LBS

## 2023-10-19 DIAGNOSIS — I35.0 AORTIC STENOSIS, SEVERE: ICD-10-CM

## 2023-10-19 DIAGNOSIS — Z95.3 S/P TAVR (TRANSCATHETER AORTIC VALVE REPLACEMENT), BIOPROSTHETIC: ICD-10-CM

## 2023-10-19 PROCEDURE — 93321 DOPPLER ECHO F-UP/LMTD STD: CPT

## 2023-10-19 PROCEDURE — 93325 DOPPLER ECHO COLOR FLOW MAPG: CPT

## 2023-10-19 PROCEDURE — 93308 TTE F-UP OR LMTD: CPT

## 2023-10-23 LAB
BH CV ECHO MEAS - AO MAX PG: 39.4 MMHG
BH CV ECHO MEAS - AO MEAN PG: 19.2 MMHG
BH CV ECHO MEAS - AO ROOT DIAM: 3.1 CM
BH CV ECHO MEAS - AO V2 MAX: 313.2 CM/SEC
BH CV ECHO MEAS - AO V2 VTI: 62.6 CM
BH CV ECHO MEAS - AVA(I,D): 1.59 CM2
BH CV ECHO MEAS - EDV(CUBED): 132.7 ML
BH CV ECHO MEAS - EDV(MOD-SP2): 162 ML
BH CV ECHO MEAS - EDV(MOD-SP4): 137 ML
BH CV ECHO MEAS - EF(MOD-BP): 70 %
BH CV ECHO MEAS - EF(MOD-SP2): 68.5 %
BH CV ECHO MEAS - EF(MOD-SP4): 68.6 %
BH CV ECHO MEAS - ESV(CUBED): 23.6 ML
BH CV ECHO MEAS - ESV(MOD-SP2): 51 ML
BH CV ECHO MEAS - ESV(MOD-SP4): 43 ML
BH CV ECHO MEAS - FS: 43.7 %
BH CV ECHO MEAS - IVS/LVPW: 0.81 CM
BH CV ECHO MEAS - IVSD: 1.05 CM
BH CV ECHO MEAS - LA DIMENSION: 4.5 CM
BH CV ECHO MEAS - LAT PEAK E' VEL: 6.3 CM/SEC
BH CV ECHO MEAS - LV DIASTOLIC VOL/BSA (35-75): 57.9 CM2
BH CV ECHO MEAS - LV MASS(C)D: 234.3 GRAMS
BH CV ECHO MEAS - LV MAX PG: 7.6 MMHG
BH CV ECHO MEAS - LV MEAN PG: 4 MMHG
BH CV ECHO MEAS - LV SYSTOLIC VOL/BSA (12-30): 18.2 CM2
BH CV ECHO MEAS - LV V1 MAX: 138 CM/SEC
BH CV ECHO MEAS - LV V1 VTI: 31.8 CM
BH CV ECHO MEAS - LVIDD: 5.1 CM
BH CV ECHO MEAS - LVIDS: 2.9 CM
BH CV ECHO MEAS - LVOT AREA: 3.1 CM2
BH CV ECHO MEAS - LVOT DIAM: 2 CM
BH CV ECHO MEAS - LVPWD: 1.3 CM
BH CV ECHO MEAS - MED PEAK E' VEL: 4.4 CM/SEC
BH CV ECHO MEAS - MV A MAX VEL: 62.2 CM/SEC
BH CV ECHO MEAS - MV DEC SLOPE: 227 CM/SEC2
BH CV ECHO MEAS - MV DEC TIME: 0.32 SEC
BH CV ECHO MEAS - MV E MAX VEL: 76 CM/SEC
BH CV ECHO MEAS - MV E/A: 1.22
BH CV ECHO MEAS - MV MAX PG: 3.2 MMHG
BH CV ECHO MEAS - MV MEAN PG: 2 MMHG
BH CV ECHO MEAS - MV P1/2T: 123.4 MSEC
BH CV ECHO MEAS - MV V2 VTI: 33.9 CM
BH CV ECHO MEAS - MVA(P1/2T): 1.78 CM2
BH CV ECHO MEAS - MVA(VTI): 2.9 CM2
BH CV ECHO MEAS - PA ACC SLOPE: 792 CM/SEC2
BH CV ECHO MEAS - PA ACC TIME: 0.12 SEC
BH CV ECHO MEAS - PA V2 MAX: 126 CM/SEC
BH CV ECHO MEAS - PAPD(PI EDV): 4 MMHG
BH CV ECHO MEAS - PI END-D VEL: 95.6 CM/SEC
BH CV ECHO MEAS - RAP SYSTOLE: 8 MMHG
BH CV ECHO MEAS - RVSP: 37 MMHG
BH CV ECHO MEAS - SI(MOD-SP2): 46.9 ML/M2
BH CV ECHO MEAS - SI(MOD-SP4): 39.7 ML/M2
BH CV ECHO MEAS - SV(LVOT): 99.7 ML
BH CV ECHO MEAS - SV(MOD-SP2): 111 ML
BH CV ECHO MEAS - SV(MOD-SP4): 94 ML
BH CV ECHO MEAS - TR MAX PG: 25.8 MMHG
BH CV ECHO MEAS - TR MAX VEL: 253.6 CM/SEC
BH CV ECHO MEASUREMENTS AVERAGE E/E' RATIO: 14.21
BH CV VAS BP LEFT ARM: NORMAL MMHG
IVRT: 69 MS
LV EF 2D ECHO EST: 65 %

## 2023-10-25 ENCOUNTER — OFFICE VISIT (OUTPATIENT)
Dept: CARDIOLOGY | Facility: CLINIC | Age: 73
End: 2023-10-25
Payer: MEDICARE

## 2023-10-25 VITALS
HEIGHT: 73 IN | DIASTOLIC BLOOD PRESSURE: 64 MMHG | OXYGEN SATURATION: 94 % | HEART RATE: 68 BPM | BODY MASS INDEX: 32.99 KG/M2 | SYSTOLIC BLOOD PRESSURE: 126 MMHG

## 2023-10-25 DIAGNOSIS — I34.0 NONRHEUMATIC MITRAL VALVE REGURGITATION: ICD-10-CM

## 2023-10-25 DIAGNOSIS — I10 PRIMARY HYPERTENSION: ICD-10-CM

## 2023-10-25 DIAGNOSIS — E78.5 HYPERLIPIDEMIA LDL GOAL <100: ICD-10-CM

## 2023-10-25 DIAGNOSIS — Z95.3 S/P TAVR (TRANSCATHETER AORTIC VALVE REPLACEMENT), BIOPROSTHETIC: ICD-10-CM

## 2023-10-25 DIAGNOSIS — I35.0 AORTIC STENOSIS, SEVERE: Primary | ICD-10-CM

## 2023-10-25 RX ORDER — EZETIMIBE 10 MG/1
10 TABLET ORAL DAILY
COMMUNITY
Start: 2023-06-15 | End: 2024-06-14

## 2023-10-25 RX ORDER — DULOXETIN HYDROCHLORIDE 30 MG/1
CAPSULE, DELAYED RELEASE ORAL
COMMUNITY

## 2023-10-25 RX ORDER — METHOCARBAMOL 500 MG/1
500 TABLET, FILM COATED ORAL
COMMUNITY
Start: 2023-06-15

## 2023-10-25 RX ORDER — IPRATROPIUM BROMIDE 21 UG/1
2 SPRAY, METERED NASAL
COMMUNITY
Start: 2023-06-15

## 2023-10-25 NOTE — PROGRESS NOTES
[facility]    Patient ID: Mitali Cruz is a 73 y.o. male.  : 1950   Contact: [phone]    Encounter date: 10/25/2023    PCP: Js Orozco MD      Chief complaint:   Chief Complaint   Patient presents with    Aortic Stenosis       Problem List:  Aortic stenosis s/p TAVR  Nulcear stress test , [facility]: positive thallium GXT for ischemia in inferior area. Normal EF  [facility] 2009: normal coronaries, false positive cardiolite GXT.   Echo 21, S: EF 60-65%, moderate to severe AS (Ao max PG 78.1 mmHg, Ao mean PG 46 mmHg, Ao V2 VTI 85.8 cm, JAMES 1.1cm2), RVSP < 35 mmHg.    CHRISTOPHER 21: EF 55%, moderate LVH, severe AS, aortic annulus 2.4 cm2, mild MR, mild TR.   [facility] 21: minor CAD, severe AS.   S/p TAVR using a 26 mm SUSHANT 3 pericardial prosthesis, 10/18/21, [facility].   Post op CHRITSOPHER: Ao max PG 13 mmHg and mean PG 6 mmHg.   Echo 21: EF 60%, grade I diastolic dysfunction, 26 mm tissue Shah SUSHANT 3 valve in the aortic position. Mean gradient 11 mmHg. JAMES 1.6cm2. Trace paravalvular leak beneath the MV leaflet, trace to mild MR. Mild TR.  Echocardiogram, 10/13/2022; EF 65%. Status post 26 mm SUSHANT 3 pericardial prosthesis in the aortic position. Aortic valve mean pressure gradient is 14 mmHg.  Trivial paravalvular leak beneath the anterior mitral valve leaflet.  Unchanged from previous echo. Mild mitral and tricuspid regurgitation. RVSP from TR is 37 mmHg. No significant change from previous echocardiogram.  Echo, 10/19/2023: EF 65%. 26 mm SUSHANT 3 pericardial prosthesis. Aortic valve area is 1.6 cm2.  Aortic valve mean pressure gradient is 19 mmHg. Trace to mild MR. Mild TR. RVSP is 37 mmHg.   Dizziness  30 day MCOT, 01/10/2021; NSR and occasional PVCs. VT was asx. (two 4 beats runs and one 10 beat run.)  Hypertension  Type II diabetes  Prostate cancer  Hyperlipidemia  Chronic kidney disease, stage 3  ALONZO  Adrenal tumor  BPH    Allergies   Allergen  Reactions    Lisinopril Cough    Metformin GI Intolerance    Nifedipine Swelling    Singulair [Montelukast] Hives    Statins Other (See Comments)     muscle aches, fatigue       Current Medications:    Current Outpatient Medications:     acetone, urine, test strip, Check urine for Ketones as needed for blood glucose greater than 250 or illness, Disp: , Rfl:     aspirin 81 MG EC tablet, Take 1 tablet by mouth Daily., Disp: , Rfl:     Diclofenac Sodium (VOLTAREN) 1 % gel gel, Place 1-2 g on the skin as directed by provider., Disp: , Rfl:     DULoxetine (CYMBALTA) 30 MG capsule, Take  by mouth., Disp: , Rfl:     ezetimibe (ZETIA) 10 MG tablet, Take 1 tablet by mouth Daily., Disp: , Rfl:     fenofibrate 160 MG tablet, Take 1 tablet by mouth Daily., Disp: , Rfl:     guaiFENesin 200 MG tablet, Take 2 tablets by mouth Every 4 (Four) Hours As Needed for Cough., Disp: , Rfl:     hydroCHLOROthiazide (HYDRODIURIL) 12.5 MG tablet, Take 1 tablet by mouth Daily., Disp: , Rfl:     Insulin Aspart (NOVOLOG FLEXPEN SC), 30 Units 3 (Three) Times a Day. Sliding, Disp: , Rfl:     Insulin Degludec (TRESIBA FLEXTOUCH SC), 70 Units Daily., Disp: , Rfl:     ipratropium (ATROVENT) 0.03 % nasal spray, 2 sprays into the nostril(s) as directed by provider., Disp: , Rfl:     methocarbamol (ROBAXIN) 500 MG tablet, Take 1 tablet by mouth., Disp: , Rfl:     olmesartan (BENICAR) 40 MG tablet, Daily., Disp: , Rfl:     Ozempic, 0.25 or 0.5 MG/DOSE, 2 MG/1.5ML solution pen-injector, 0.25 mg 1 (One) Time Per Week., Disp: , Rfl:     prazosin (MINIPRESS) 5 MG capsule, Take 1 capsule by mouth Every Night., Disp: , Rfl:     atorvastatin (LIPITOR) 20 MG tablet, Take 20 mg by mouth Daily. (Patient not taking: Reported on 5/31/2023), Disp: , Rfl:     dapagliflozin Propanediol 10 MG tablet, Take 10 mg by mouth Daily., Disp: , Rfl:     Probiotic Product (PROBIOTIC ADVANCED PO), Take  by mouth Daily. (Patient not taking: Reported on 10/25/2023), Disp: , Rfl:  "    HPI    Mitali Cruz is a 73 y.o. male who presents today for a follow up of aortic stenosis s/p TAVR and cardiac risk factors. Since last visit, he has been doing well from a cardiac standpoint. He has had a sleep study that was positive for NADIA and ECHO. He has a follow up with the sleep center to hopefully be fitted with a mask and machine. He had a colonoscopy 2 months ago and had 9 polyps removed. He is still having fatigue and shortness of breath with exertion. He is having some lightheadedness a couple times a week. He has started cymbalta for left hand pain after his carpal tunnel surgery. He has not been as active as he would like due to pain in his abdomen from nerve damage with prostate removal.  He states his last hemoglobin A1c was under control around 6.       The following portions of the patient's history were reviewed and updated as appropriate: allergies, current medications and problem list.    Pertinent positives as listed in the HPI.  All other systems reviewed are negative.         Vitals:    10/25/23 1454   BP: 126/64   BP Location: Left arm   Patient Position: Sitting   Cuff Size: Adult   Pulse: 68   SpO2: 94%   Height: 185.4 cm (72.99\")       Physical Exam:  General: Alert and oriented, obese  Neck: Jugular venous pressure is within normal limits. Carotids have normal upstrokes without bruits.   Cardiovascular: Heart has a nondisplaced focal PMI. Regular rate and rhythm. No murmur, gallop or rub.  Lungs: Clear, no rales or wheezes. Equal expansion is noted.   Extremities: Show no edema.  Skin: Warm and dry.  Neurologic: Nonfocal.     Diagnostic Data (reviewed with patient):  Lab date: 08/24/2023  FLP: , , HDL 21, LDL 80 (06/15/2023)  CMP: Glu 143, BUN 42, Creat 2.03, eGFR 34.4, Na 138, K 4.5, Cl 104, CO2 20, Ca 9.2, Alk Phos 51, AST 38, ALT 33  CBC: WBC 9.3, RBC 5.69, HGB 15.2, HCT 47.4, MCV 83, MCH 26.7,     Advance Care Planning   ACP discussion was held with the " patient during this visit. Patient does not have an advance directive, declines further assistance.         Procedures      Assessment:    ICD-10-CM ICD-9-CM   1. Aortic stenosis, severe  I35.0 424.1   2. S/p TAVR (transcatheter aortic valve replacement), bioprosthetic  Z95.3 V42.2   3. Nonrheumatic mitral valve regurgitation  I34.0 424.0   4. Primary hypertension  I10 401.9   5. Hyperlipidemia LDL goal <100  E78.5 272.4         Plan:  Continue on aspirin 81 mg for antiplatelet therapy.   Continue on hydrochlorothiazide 12.5 mg daily for hypertension. Recommended stopping due to dizziness. He is going to track his blood pressure consistently at home and talk with his family doctor before stopping medication.   Continue on Zetia 10 mg and fenofibrate 160 mg for hyperlipidemia and hypertriglyceridemia.   Follow up with sleep center about CPAP machine.  Continue all other current medications.  F/up in 6 months with limited Echo for EF and aortic gradient, sooner if needed.     KATARINA Evangelista

## 2024-04-09 ENCOUNTER — HOSPITAL ENCOUNTER (OUTPATIENT)
Dept: CARDIOLOGY | Facility: HOSPITAL | Age: 74
Discharge: HOME | End: 2024-04-09
Payer: MEDICARE

## 2024-04-09 VITALS — WEIGHT: 249.12 LBS | BODY MASS INDEX: 33.02 KG/M2 | HEIGHT: 73 IN

## 2024-04-09 DIAGNOSIS — Z95.3 S/P TAVR (TRANSCATHETER AORTIC VALVE REPLACEMENT), BIOPROSTHETIC: ICD-10-CM

## 2024-04-09 DIAGNOSIS — I35.0 AORTIC STENOSIS, SEVERE: ICD-10-CM

## 2024-04-09 LAB
BH CV ECHO MEAS - AO MAX PG: 22.1 MMHG
BH CV ECHO MEAS - AO MEAN PG: 11 MMHG
BH CV ECHO MEAS - AO V2 MAX: 235 CM/SEC
BH CV ECHO MEAS - AO V2 VTI: 51.6 CM
BH CV ECHO MEAS - AVA(I,D): 1.6 CM2
BH CV ECHO MEAS - EDV(CUBED): 115.5 ML
BH CV ECHO MEAS - EDV(MOD-SP2): 73 ML
BH CV ECHO MEAS - EDV(MOD-SP4): 72 ML
BH CV ECHO MEAS - EF(MOD-BP): 68 %
BH CV ECHO MEAS - EF(MOD-SP2): 67.1 %
BH CV ECHO MEAS - EF(MOD-SP4): 69.4 %
BH CV ECHO MEAS - ESV(CUBED): 27.5 ML
BH CV ECHO MEAS - ESV(MOD-SP2): 24 ML
BH CV ECHO MEAS - ESV(MOD-SP4): 22 ML
BH CV ECHO MEAS - FS: 38 %
BH CV ECHO MEAS - IVS/LVPW: 1 CM
BH CV ECHO MEAS - IVSD: 1.28 CM
BH CV ECHO MEAS - LV DIASTOLIC VOL/BSA (35-75): 30.7 CM2
BH CV ECHO MEAS - LV MASS(C)D: 245.8 GRAMS
BH CV ECHO MEAS - LV MAX PG: 6.8 MMHG
BH CV ECHO MEAS - LV MEAN PG: 4.5 MMHG
BH CV ECHO MEAS - LV SYSTOLIC VOL/BSA (12-30): 9.4 CM2
BH CV ECHO MEAS - LV V1 MAX: 130.3 CM/SEC
BH CV ECHO MEAS - LV V1 VTI: 26.2 CM
BH CV ECHO MEAS - LVIDD: 4.9 CM
BH CV ECHO MEAS - LVIDS: 3 CM
BH CV ECHO MEAS - LVOT AREA: 2.8 CM2
BH CV ECHO MEAS - LVOT DIAM: 2 CM
BH CV ECHO MEAS - LVPWD: 1.28 CM
BH CV ECHO MEAS - SI(MOD-SP2): 20.9 ML/M2
BH CV ECHO MEAS - SI(MOD-SP4): 21.3 ML/M2
BH CV ECHO MEAS - SV(LVOT): 85.3 ML
BH CV ECHO MEAS - SV(MOD-SP2): 49 ML
BH CV ECHO MEAS - SV(MOD-SP4): 50 ML
BH CV VAS BP RIGHT ARM: NORMAL MMHG
LV EF 2D ECHO EST: 60 %

## 2024-04-09 PROCEDURE — 93308 TTE F-UP OR LMTD: CPT

## 2024-04-09 PROCEDURE — 93308 TTE F-UP OR LMTD: CPT | Performed by: INTERNAL MEDICINE

## 2024-04-09 PROCEDURE — 93321 DOPPLER ECHO F-UP/LMTD STD: CPT | Performed by: INTERNAL MEDICINE

## 2024-04-09 PROCEDURE — 93325 DOPPLER ECHO COLOR FLOW MAPG: CPT | Performed by: INTERNAL MEDICINE

## 2024-04-09 PROCEDURE — 93321 DOPPLER ECHO F-UP/LMTD STD: CPT

## 2024-04-09 PROCEDURE — 93325 DOPPLER ECHO COLOR FLOW MAPG: CPT

## 2024-04-11 ENCOUNTER — PATIENT ROUNDING (BHMG ONLY) (OUTPATIENT)
Dept: CARDIOLOGY | Facility: CLINIC | Age: 74
End: 2024-04-11
Payer: MEDICARE

## 2024-04-11 ENCOUNTER — OFFICE VISIT (OUTPATIENT)
Dept: CARDIOLOGY | Facility: CLINIC | Age: 74
End: 2024-04-11
Payer: MEDICARE

## 2024-04-11 VITALS
OXYGEN SATURATION: 94 % | BODY MASS INDEX: 33.08 KG/M2 | DIASTOLIC BLOOD PRESSURE: 78 MMHG | HEIGHT: 73 IN | HEART RATE: 65 BPM | WEIGHT: 249.6 LBS | SYSTOLIC BLOOD PRESSURE: 130 MMHG

## 2024-04-11 DIAGNOSIS — E78.2 ELEVATED CHOLESTEROL WITH ELEVATED TRIGLYCERIDES: ICD-10-CM

## 2024-04-11 DIAGNOSIS — Z95.3 S/P TAVR (TRANSCATHETER AORTIC VALVE REPLACEMENT), BIOPROSTHETIC: Primary | ICD-10-CM

## 2024-04-11 DIAGNOSIS — I10 PRIMARY HYPERTENSION: Chronic | ICD-10-CM

## 2024-04-11 PROCEDURE — 99214 OFFICE O/P EST MOD 30 MIN: CPT | Performed by: PHYSICIAN ASSISTANT

## 2024-04-11 PROCEDURE — 3078F DIAST BP <80 MM HG: CPT | Performed by: PHYSICIAN ASSISTANT

## 2024-04-11 PROCEDURE — 3075F SYST BP GE 130 - 139MM HG: CPT | Performed by: PHYSICIAN ASSISTANT

## 2024-04-11 RX ORDER — INSULIN LISPRO 100 U/ML
INJECTION, SOLUTION INTRAVENOUS; SUBCUTANEOUS
COMMUNITY
Start: 2024-04-01

## 2024-04-11 RX ORDER — PRIMIDONE 50 MG/1
25 TABLET ORAL
COMMUNITY
Start: 2024-03-06

## 2024-04-11 RX ORDER — OMEGA-3-ACID ETHYL ESTERS 1 G/1
2 CAPSULE, LIQUID FILLED ORAL 2 TIMES DAILY
Qty: 90 CAPSULE | Refills: 3 | Status: SHIPPED | OUTPATIENT
Start: 2024-04-11

## 2024-04-11 RX ORDER — DAPAGLIFLOZIN 10 MG/1
10 TABLET, FILM COATED ORAL DAILY
COMMUNITY
Start: 2023-08-21 | End: 2024-08-20

## 2024-04-11 NOTE — PROGRESS NOTES
[facility]    Date: 2024    Patient ID: Mitali Cruz is a 73 y.o. male   : 1950   Contact: [phone]         Chief Complaint:    Chief Complaint   Patient presents with    Aortic stenosis, severe       Problem List:  Aortic stenosis s/p TAVR  Nulcear stress test , [facility]: positive thallium GXT for ischemia in inferior area. Normal EF  [facility] 2009: normal coronaries, false positive cardiolite GXT.   Echo 21, S: EF 60-65%, moderate to severe AS (Ao max PG 78.1 mmHg, Ao mean PG 46 mmHg, Ao V2 VTI 85.8 cm, JAMES 1.1cm2), RVSP < 35 mmHg.    CHRISTOPHER 21: EF 55%, moderate LVH, severe AS, aortic annulus 2.4 cm2, mild MR, mild TR.   [facility] 21: minor CAD, severe AS.   S/p TAVR using a 26 mm SUSHANT 3 pericardial prosthesis, 10/18/21, [facility].   Post op CHRISTOPHER: Ao max PG 13 mmHg and mean PG 6 mmHg.   Echo 21: EF 60%, grade I diastolic dysfunction, 26 mm tissue Shah SUSHANT 3 valve in the aortic position. Mean gradient 11 mmHg. JAMES 1.6cm2. Trace paravalvular leak beneath the MV leaflet, trace to mild MR. Mild TR.  Echocardiogram, 10/13/2022; EF 65%. Status post 26 mm SUSHANT 3 pericardial prosthesis in the aortic position. Aortic valve mean pressure gradient is 14 mmHg.  Trivial paravalvular leak beneath the anterior mitral valve leaflet.  Unchanged from previous echo. Mild mitral and tricuspid regurgitation. RVSP from TR is 37 mmHg. No significant change from previous echocardiogram.  Echo, 10/19/2023: EF 65%. 26 mm SUSHANT 3 pericardial prosthesis. Aortic valve area is 1.6 cm2.  Aortic valve mean pressure gradient is 19 mmHg. Trace to mild MR. Mild TR. RVSP is 37 mmHg.   Dizziness  30 day MCOT, 01/10/2021; NSR and occasional PVCs. VT was asx. (two 4 beats runs and one 10 beat run.)  Hypertension  Type II diabetes  Prostate cancer  Hyperlipidemia  Chronic kidney disease, stage 3  ALONZO  Adrenal tumor  BPH      Allergies   Allergen Reactions    Montelukast Hives  and Swelling     Swelling of lips and tongue    Lisinopril Cough    Nifedipine Swelling    Metformin GI Intolerance, Diarrhea, Nausea Only and Unknown - Low Severity    Statins Other (See Comments)     muscle aches, fatigue         Current Outpatient Medications:     acetone, urine, test strip, Check urine for Ketones as needed for blood glucose greater than 250 or illness, Disp: , Rfl:     Admelog 100 UNIT/ML injection, , Disp: , Rfl:     aspirin 81 MG EC tablet, Take 1 tablet by mouth Daily., Disp: , Rfl:     dapagliflozin Propanediol 10 MG tablet, Take 10 mg by mouth Daily., Disp: , Rfl:     Diclofenac Sodium (VOLTAREN) 1 % gel gel, Place 1-2 g on the skin as directed by provider., Disp: , Rfl:     DULoxetine (CYMBALTA) 30 MG capsule, Take  by mouth., Disp: , Rfl:     ezetimibe (ZETIA) 10 MG tablet, Take 1 tablet by mouth Daily., Disp: , Rfl:     fenofibrate 160 MG tablet, Take 1 tablet by mouth Daily., Disp: , Rfl:     guaiFENesin 200 MG tablet, Take 2 tablets by mouth Every 4 (Four) Hours As Needed for Cough., Disp: , Rfl:     hydroCHLOROthiazide (HYDRODIURIL) 12.5 MG tablet, Take 1 tablet by mouth Daily., Disp: , Rfl:     Insulin Aspart (NOVOLOG FLEXPEN SC), 30 Units 3 (Three) Times a Day. Sliding, Disp: , Rfl:     Insulin Degludec (TRESIBA FLEXTOUCH SC), 70 Units Daily., Disp: , Rfl:     ipratropium (ATROVENT) 0.03 % nasal spray, 2 sprays into the nostril(s) as directed by provider., Disp: , Rfl:     methocarbamol (ROBAXIN) 500 MG tablet, Take 1 tablet by mouth., Disp: , Rfl:     olmesartan (BENICAR) 40 MG tablet, Daily., Disp: , Rfl:     Ozempic, 0.25 or 0.5 MG/DOSE, 2 MG/1.5ML solution pen-injector, 0.25 mg 1 (One) Time Per Week., Disp: , Rfl:     prazosin (MINIPRESS) 5 MG capsule, Take 1 capsule by mouth Every Night., Disp: , Rfl:     primidone (MYSOLINE) 50 MG tablet, Take 0.5 tablets by mouth., Disp: , Rfl:     omega-3 acid ethyl esters (Lovaza) 1 g capsule, Take 2 capsules by mouth 2 (Two) Times a Day.,  "Disp: 90 capsule, Rfl: 3    Probiotic Product (PROBIOTIC ADVANCED PO), Take  by mouth Daily. (Patient not taking: Reported on 4/11/2024), Disp: , Rfl:      History of Present Illness: Mitali Cruz is a 73 y.o. male who is here today for 6-month follow-up for aortic stenosis s/p TAVR, hypertension, hyperlipidemia.  Patient overall is doing well.  He underwent carpal tunnel release which he states he is still having some difficulty after the surgery.  He had nerve conduction study done recently but is unsure of the results.  Patient states that he checks his blood pressure every once a while but not on a regular basis.  He is currently working with his endocrinologist to keep his blood sugar stabilized.  Overall patient states that he is doing well other than his neuropathy type symptoms from carpal tunnel and once he can get this under control he feels as if he could be more active.      The following portions of the patient's history were reviewed and updated as appropriate: allergies, current medications and problem list.     Pertinent positives as listed in the HPI.  All other systems reviewed are negative.            Vitals:    04/11/24 1352   BP: 130/78   BP Location: Right arm   Patient Position: Sitting   Pulse: 65   SpO2: 94%   Weight: 113 kg (249 lb 9.6 oz)   Height: 185.4 cm (72.99\")     Body mass index is 32.94 kg/m².    Physical Exam:  General: Alert and oriented.  Neck: Jugular venous pressure is within normal limits. Carotids have normal upstrokes without bruits.   Cardiovascular: Regular rate and rhythm. 1/6 XIANG  Lungs: Clear, no rales or wheezes. Equal expansion is noted.   Extremities: No peripheral edema  Skin: Warm and dry.  Neurologic: Nonfocal.     Diagnostic data (reviewed with patient):  CMP:   Lab Results   Component Value Date    GLUCOSE 130 (H) 11/10/2021    BUN 39 (H) 06/22/2022    CREATININE 1.83 (H) 06/22/2022    EGFRIFNONA 37 (L) 06/22/2022    EGFRIFAFRI 45 (L) 06/22/2022    BCR 21 " 06/22/2022    K 4.5 06/22/2022    CO2 22 06/22/2022    CALCIUM 9.3 06/22/2022    ALBUMIN 4.3 06/22/2022    AST 27 10/15/2021    ALT 25 10/15/2021       CBC:    Lab Results   Component Value Date    WBC 9.30 08/24/2023    HGB 15.2 08/24/2023    HCT 47.4 08/24/2023    MCV 83 08/24/2023     08/24/2023       LIPIDS:    Lab Results   Component Value Date    CHOL 123 08/13/2021    TRIG 182 (H) 08/13/2021    HDL 23 (L) 08/13/2021    LDL 69 08/13/2021       HgbA1c:    Lab Results   Component Value Date    HGBA1C 6.5 02/20/2023         Advance Care Planning   ACP discussion was declined by the patient. Patient does not have an advance directive, declines further assistance.            Assessment:  1. S/p TAVR (transcatheter aortic valve replacement), bioprosthetic    2. Primary hypertension    3. Elevated cholesterol with elevated triglycerides             Plan:  Patient has stable cardiac status at this time.  Discussed findings of echo and no significant change from previous echo of gradient across TAVR valve.  Blood pressure on the high end of normal so discussed with patient to check his blood pressure at least once a week and if his blood pressure is over 130/80 to contact us back for any changes to his medications.  Will order patient Lovaza 2 g twice daily as patient's triglycerides remain elevated.  Did discuss with the patient increasing exercise and watching his diet will also help with his triglycerides and HDL.  Continue aspirin 81 mg for antiplatelet therapy.  Continue Zetia 10 mg daily for hyperlipidemia.  Continue olmesartan 40 mg daily, hydrochlorothiazide 12.5 mg daily for hypertension.  Continue all other current medications.  F/up in 12 months, sooner if needed.    Tamiko Rico PA-C    71

## 2024-04-11 NOTE — PROGRESS NOTES
April 11, 2024    Hello, may I speak with Mitali Cruz?    My name is JOSELINE         I am  with [facility]  [address]  [address]  [place] [address] [phone].    Before we get started may I verify your date of birth? 1950    I am calling to officially welcome you to our practice and ask about your recent visit. Is this a good time to talk? yes    Tell me about your visit with us. What things went well?  EVERYTHING, EVERYTHING SEEMED GO FINE NO LONG WAIT TIME       We're always looking for ways to make our patients' experiences even better. Do you have recommendations on ways we may improve?  no    Overall were you satisfied with your first visit to our practice? yes       I appreciate you taking the time to speak with me today. Is there anything else I can do for you? no      Thank you, and have a great day.

## 2024-04-15 ENCOUNTER — TELEPHONE (OUTPATIENT)
Dept: CARDIOLOGY | Facility: CLINIC | Age: 74
End: 2024-04-15
Payer: MEDICARE

## 2024-04-15 NOTE — TELEPHONE ENCOUNTER
Patient has been denied Omega-3-acid-ethyl esters medication.  LM for patient as above.  He is encouraged to call with any questions or concerns.

## 2024-09-23 ENCOUNTER — TELEPHONE (OUTPATIENT)
Dept: CARDIOLOGY | Facility: CLINIC | Age: 74
End: 2024-09-23
Payer: MEDICARE

## 2024-09-23 DIAGNOSIS — Z95.3 S/P TAVR (TRANSCATHETER AORTIC VALVE REPLACEMENT), BIOPROSTHETIC: ICD-10-CM

## 2024-09-23 DIAGNOSIS — I10 PRIMARY HYPERTENSION: Primary | Chronic | ICD-10-CM

## 2024-09-23 DIAGNOSIS — I34.0 NONRHEUMATIC MITRAL VALVE REGURGITATION: ICD-10-CM

## 2024-09-23 RX ORDER — DOXAZOSIN 4 MG/1
6 TABLET ORAL NIGHTLY
Start: 2024-09-23

## 2024-10-15 ENCOUNTER — TELEPHONE (OUTPATIENT)
Dept: CARDIOLOGY | Facility: CLINIC | Age: 74
End: 2024-10-15
Payer: MEDICARE

## 2024-10-15 NOTE — TELEPHONE ENCOUNTER
Spoke to pt and informed him what Dr. Hagen said about results. Pt verbalized understanding and had no further questions.

## 2025-04-30 ENCOUNTER — OFFICE VISIT (OUTPATIENT)
Dept: CARDIOLOGY | Facility: CLINIC | Age: 75
End: 2025-04-30
Payer: MEDICARE

## 2025-04-30 VITALS
DIASTOLIC BLOOD PRESSURE: 50 MMHG | HEART RATE: 55 BPM | BODY MASS INDEX: 32.2 KG/M2 | WEIGHT: 243 LBS | HEIGHT: 73 IN | SYSTOLIC BLOOD PRESSURE: 136 MMHG | OXYGEN SATURATION: 96 %

## 2025-04-30 DIAGNOSIS — I10 PRIMARY HYPERTENSION: ICD-10-CM

## 2025-04-30 DIAGNOSIS — E78.2 ELEVATED CHOLESTEROL WITH ELEVATED TRIGLYCERIDES: ICD-10-CM

## 2025-04-30 DIAGNOSIS — Z95.3 S/P TAVR (TRANSCATHETER AORTIC VALVE REPLACEMENT), BIOPROSTHETIC: Primary | ICD-10-CM

## 2025-04-30 RX ORDER — GABAPENTIN 100 MG/1
200 CAPSULE ORAL 3 TIMES DAILY
COMMUNITY
Start: 2025-01-22

## 2025-04-30 RX ORDER — FUROSEMIDE 20 MG/1
20 TABLET ORAL DAILY
COMMUNITY
Start: 2025-02-06 | End: 2026-02-06

## 2025-04-30 NOTE — PROGRESS NOTES
[facility]    Patient ID: Mitali Cruz is a 74 y.o. male.  : 1950   Contact: [phone]    Encounter date: 2025    PCP: Js Orozco MD      Chief complaint:   Chief Complaint   Patient presents with    Aortic stenosis, severe       Problem List:  Aortic stenosis s/p TAVR  Nulcear stress test , [facility]: positive thallium GXT for ischemia in inferior area. Normal EF  [facility] 2009: normal coronaries, false positive cardiolite GXT.   Echo 21, S: EF 60-65%, moderate to severe AS (Ao max PG 78.1 mmHg, Ao mean PG 46 mmHg, Ao V2 VTI 85.8 cm, JAMES 1.1cm2), RVSP < 35 mmHg.    [facility]HER 21: EF 55%, moderate LVH, severe AS, aortic annulus 2.4 cm2, mild MR, mild TR.   [facility] 21: minor CAD, severe AS.   S/p TAVR using a 26 mm SUSHANT 3 pericardial prosthesis, 10/18/21, [facility].   Post op CHRISTOPHER: Ao max PG 13 mmHg and mean PG 6 mmHg.   Echo 21: EF 60%, grade I diastolic dysfunction, 26 mm tissue Shah SUSHANT 3 valve in the aortic position. Mean gradient 11 mmHg. JAMES 1.6cm2. Trace paravalvular leak beneath the MV leaflet, trace to mild MR. Mild TR.  Echocardiogram, 10/13/2022; EF 65%. Status post 26 mm SUSHANT 3 pericardial prosthesis in the aortic position. Aortic valve mean pressure gradient is 14 mmHg.  Trivial paravalvular leak beneath the anterior mitral valve leaflet.  Unchanged from previous echo. Mild mitral and tricuspid regurgitation. RVSP from TR is 37 mmHg. No significant change from previous echocardiogram.  Echo, 10/19/2023: EF 65%. 26 mm SUSHANT 3 pericardial prosthesis. Aortic valve area is 1.6 cm2.  Aortic valve mean pressure gradient is 19 mmHg. Trace to mild MR. Mild TR. RVSP is 37 mmHg.   Dizziness  30 day MCOT, 01/10/2021; NSR and occasional PVCs. VT was asx. (two 4 beats runs and one 10 beat run.)  Hypertension  Hol[facility], 2024: A relatively benign monitor study. No significant abnormalities.  Type II diabetes  Prostate  cancer  Hyperlipidemia  Chronic kidney disease, stage 3  ALONZO  Adrenal tumor  BPH       Allergies   Allergen Reactions    Montelukast Anaphylaxis and Hives    Nifedipine Swelling    Lisinopril Cough    Metformin Diarrhea, Nausea Only and GI Intolerance    Statins Myalgia       Current Medications:    Current Outpatient Medications:     acetone, urine, test strip, Check urine for Ketones as needed for blood glucose greater than 250 or illness, Disp: , Rfl:     Admelog 100 UNIT/ML injection, , Disp: , Rfl:     aspirin 81 MG EC tablet, Take 1 tablet by mouth Daily., Disp: , Rfl:     Diclofenac Sodium (VOLTAREN) 1 % gel gel, Place 1-2 g on the skin as directed by provider., Disp: , Rfl:     doxazosin (CARDURA) 4 MG tablet, Take 1.5 tablets by mouth Every Night., Disp: , Rfl:     fenofibrate 160 MG tablet, Take 1 tablet by mouth Daily., Disp: , Rfl:     furosemide (LASIX) 20 MG tablet, Take 1 tablet by mouth Daily., Disp: , Rfl:     gabapentin (NEURONTIN) 100 MG capsule, Take 2 capsules by mouth 3 (Three) Times a Day., Disp: , Rfl:     guaiFENesin 200 MG tablet, Take 2 tablets by mouth Every 4 (Four) Hours As Needed for Cough., Disp: , Rfl:     hydroCHLOROthiazide (HYDRODIURIL) 12.5 MG tablet, Take 2 tablets by mouth Daily., Disp: , Rfl:     Insulin Aspart (NOVOLOG FLEXPEN SC), 30 Units 3 (Three) Times a Day. Sliding, Disp: , Rfl:     Insulin Degludec (TRESIBA FLEXTOUCH SC), 70 Units Daily., Disp: , Rfl:     ipratropium (ATROVENT) 0.06 % nasal spray, 2 sprays into the nostril(s) as directed by provider 2 (Two) Times a Day., Disp: 15 mL, Rfl: 0    methocarbamol (ROBAXIN) 500 MG tablet, Take 1 tablet by mouth., Disp: , Rfl:     olmesartan (BENICAR) 40 MG tablet, Daily., Disp: , Rfl:     Ozempic, 0.25 or 0.5 MG/DOSE, 2 MG/1.5ML solution pen-injector, 0.5 mg 1 (One) Time Per Week., Disp: , Rfl:     primidone (MYSOLINE) 50 MG tablet, Take 0.5 tablets by mouth., Disp: , Rfl:     DULoxetine (CYMBALTA) 30 MG capsule, Take  by  "mouth. (Patient not taking: Reported on 4/30/2025), Disp: , Rfl:     ezetimibe (ZETIA) 10 MG tablet, Take 1 tablet by mouth Daily., Disp: , Rfl:     omega-3 acid ethyl esters (Lovaza) 1 g capsule, Take 2 capsules by mouth 2 (Two) Times a Day. (Patient not taking: Reported on 4/30/2025), Disp: 90 capsule, Rfl: 3    HPI    Mitali Cruz is a 74 y.o. male who presents today for a follow up of a s/p TAVR, and cardiac risk factors. Since last visit, patient has been doing well overall from a cardiovascular standpoint. He says that he has been experiencing pain in his lower back. He has been attending physical therapy to help with his walking. It is recommended he get an exercise bike to have at home. His blood pressure had measured 144/66 and then when he had left 112/66. Patient denies chest pain, shortness of breath, orthopnea, palpitations, edema, dizziness, and syncope.         The following portions of the patient's history were reviewed and updated as appropriate: allergies, current medications and problem list.    Pertinent positives as listed in the HPI.  All other systems reviewed are negative.         Vitals:    04/30/25 1145   BP: 136/50   BP Location: Right arm   Patient Position: Sitting   Pulse: 55   SpO2: 96%   Weight: 110 kg (243 lb)   Height: 185.4 cm (73\")       Physical Exam:  General: Alert and oriented.  Neck: Jugular venous pressure is within normal limits. Carotids have normal upstrokes without bruits.   Cardiovascular: Heart has a nondisplaced focal PMI. Regular rate and rhythm. No murmur, gallop or rub.  Lungs: Slight wheeze. Equal expansion is noted.   Extremities: Show no edema.  Skin: Warm and dry.  Neurologic: Nonfocal.     Diagnostic Data (reviewed with patient):    Lab Results   Component Value Date    WBC 5.72 03/13/2025    RBC 5.01 03/13/2025    HGB 13.5 (L) 03/13/2025    HCT 43.1 03/13/2025    MCV 86 03/13/2025     03/13/2025        ECG 12 Lead    Date/Time: 4/30/2025 12:06 " PM  Performed by: Kelli Singh MD    Authorized by: Kelli Singh MD  Comparison: compared with previous ECG from 5/31/2023  Rhythm: sinus bradycardia    Clinical impression: normal ECG  Comments: T wave abnormality resolved           Advance Care Planning   ACP discussion was held with the patient during this visit. Patient does not have an advance directive, declines further assistance.           Assessment:    ICD-10-CM ICD-9-CM   1. S/p TAVR (transcatheter aortic valve replacement), bioprosthetic  Z95.3 V42.2   2. Primary hypertension  I10 401.9   3. Elevated cholesterol with elevated triglycerides  E78.2 272.2         Plan:    Continue on aspirin 81 mg for antiplatelet therapy.   Schedule echocardiogram for follow up on TAVR.  Have cholesterol checked next time he follows up with primary doctor.  Continue all other current medications.  F/up in 12 months, sooner if needed.    Scribed for Kelli Singh MD by Magan Keith. 4/30/2025 11:58 EDT    I Kelli Singh MD personally performed the services described in this documentation as scribed by the above individual in my presence, and it is both accurate and complete.    Kelli Singh MD, [facility]

## 2025-07-01 ENCOUNTER — HOSPITAL ENCOUNTER (OUTPATIENT)
Dept: CARDIOLOGY | Facility: HOSPITAL | Age: 75
Discharge: HOME | End: 2025-07-01
Admitting: INTERNAL MEDICINE
Payer: MEDICARE

## 2025-07-01 VITALS
BODY MASS INDEX: 32.14 KG/M2 | WEIGHT: 242.51 LBS | HEIGHT: 73 IN | DIASTOLIC BLOOD PRESSURE: 82 MMHG | SYSTOLIC BLOOD PRESSURE: 155 MMHG

## 2025-07-01 DIAGNOSIS — Z95.3 S/P TAVR (TRANSCATHETER AORTIC VALVE REPLACEMENT), BIOPROSTHETIC: ICD-10-CM

## 2025-07-01 PROCEDURE — 93325 DOPPLER ECHO COLOR FLOW MAPG: CPT

## 2025-07-01 PROCEDURE — 93321 DOPPLER ECHO F-UP/LMTD STD: CPT

## 2025-07-01 PROCEDURE — 93308 TTE F-UP OR LMTD: CPT

## 2025-07-02 LAB
AORTIC DIMENSIONLESS INDEX: 0.59 (DI)
AV MEAN PRESS GRAD SYS DOP V1V2: 11 MMHG
AV VMAX SYS DOP: 280.5 CM/SEC
BH CV ECHO MEAS - AO MAX PG: 31.5 MMHG
BH CV ECHO MEAS - AO V2 VTI: 52 CM
BH CV ECHO MEAS - AVA(I,D): 2.2 CM2
BH CV ECHO MEAS - EDV(CUBED): 97.3 ML
BH CV ECHO MEAS - EDV(MOD-SP4): 76 ML
BH CV ECHO MEAS - EF(MOD-SP4): 67.9 %
BH CV ECHO MEAS - ESV(CUBED): 22 ML
BH CV ECHO MEAS - ESV(MOD-SP4): 24.4 ML
BH CV ECHO MEAS - FS: 39.1 %
BH CV ECHO MEAS - IVS/LVPW: 1.08 CM
BH CV ECHO MEAS - IVSD: 1.3 CM
BH CV ECHO MEAS - LV DIASTOLIC VOL/BSA (35-75): 32.5 CM2
BH CV ECHO MEAS - LV MASS(C)D: 217.4 GRAMS
BH CV ECHO MEAS - LV MAX PG: 7.8 MMHG
BH CV ECHO MEAS - LV MEAN PG: 5 MMHG
BH CV ECHO MEAS - LV SYSTOLIC VOL/BSA (12-30): 10.4 CM2
BH CV ECHO MEAS - LV V1 MAX: 140 CM/SEC
BH CV ECHO MEAS - LV V1 VTI: 36.4 CM
BH CV ECHO MEAS - LVIDD: 4.6 CM
BH CV ECHO MEAS - LVIDS: 2.8 CM
BH CV ECHO MEAS - LVOT AREA: 3.1 CM2
BH CV ECHO MEAS - LVOT DIAM: 2 CM
BH CV ECHO MEAS - LVPWD: 1.2 CM
BH CV ECHO MEAS - SV(LVOT): 114.4 ML
BH CV ECHO MEAS - SV(MOD-SP4): 51.6 ML
BH CV ECHO MEAS - SVI(LVOT): 48.9 ML/M2
BH CV ECHO MEAS - SVI(MOD-SP4): 22.1 ML/M2
BH CV VAS BP RIGHT ARM: NORMAL MMHG
LV EF 2D ECHO EST: 60 %

## (undated) DEVICE — GOWN,NON-REINFORCED,SIRUS,SET IN SLV,XL: Brand: MEDLINE

## (undated) DEVICE — SUCTION CANISTER, 2500CC, RIGID: Brand: DEROYAL

## (undated) DEVICE — Device

## (undated) DEVICE — CATH DIAG EXPO .056 FL3.5 6F 100CM

## (undated) DEVICE — RADIFOCUS GLIDEWIRE: Brand: GLIDEWIRE

## (undated) DEVICE — DEV COMP RAD PRELUDESYNC 24CM

## (undated) DEVICE — STERILE PVP: Brand: MEDLINE INDUSTRIES, INC.

## (undated) DEVICE — NDL PERC 1PRT THNWALL W/BASEPLT 18G 7CM

## (undated) DEVICE — ANGIOGRAPHIC CATHETER: Brand: EXPO™

## (undated) DEVICE — CATH PACE PACEL BIPOL 5F110CM

## (undated) DEVICE — PK CATH CARD 10

## (undated) DEVICE — HNDL BLAD BARD/PARKER POLY REUS

## (undated) DEVICE — BOOT SUT XRAY DETECT STD YEL/BLU CA/50

## (undated) DEVICE — PROVIDES A STERILE INTERFACE BETWEEN THE OPERATING ROOM SURGICAL LAMPS (NON-STERILE) AND THE SURGEON OR NURSE (STERILE).: Brand: STERION®CLAMP COVER FABRIC

## (undated) DEVICE — PATIENT RETURN ELECTRODE, SINGLE-USE, CONTACT QUALITY MONITORING, ADULT, WITH 9FT CORD, FOR PATIENTS WEIGING OVER 33LBS. (15KG): Brand: MEGADYNE

## (undated) DEVICE — GLV SURG BIOGEL LTX PF 8

## (undated) DEVICE — DRSNG SURESITE WNDW 4X4.5

## (undated) DEVICE — PINNACLE INTRODUCER SHEATH: Brand: PINNACLE

## (undated) DEVICE — APPL CHLORAPREP TINTED 26ML TEAL

## (undated) DEVICE — BLANKT WARM UNDER/BDY A/ 100X195CM DISP LF

## (undated) DEVICE — GLIDEX™ COATED HYDROPHILIC GUIDEWIRE: Brand: MAGIC TORQUE™

## (undated) DEVICE — CATH GUIDE BERN 5F 65CM

## (undated) DEVICE — SI AVANTI+ 7F STD W/GW  NO OBT: Brand: AVANTI

## (undated) DEVICE — 3M™ STERI-DRAPE™ FLUOROSCOPE DRAPE, 10 PER CARTON / 4 CARTONS PER CASE, 1012: Brand: STERI-DRAPE™

## (undated) DEVICE — SHEET, DRAPE, SPLIT, STERILE: Brand: MEDLINE

## (undated) DEVICE — INTRO SHEATH PRELUDE IDEAL SPRNG COIL .021 6F 16X80CM

## (undated) DEVICE — GLV SURG BIOGEL LTX PF 7 1/2

## (undated) DEVICE — INTENDED FOR TISSUE SEPARATION, AND OTHER PROCEDURES THAT REQUIRE A SHARP SURGICAL BLADE TO PUNCTURE OR CUT.: Brand: BARD-PARKER ® STAINLESS STEEL BLADES

## (undated) DEVICE — COVER,TABLE,HVY DUTY,60"X90",STRL: Brand: MEDLINE

## (undated) DEVICE — SYR LUERLOK 30CC

## (undated) DEVICE — HDRST POSTIN FM CRDL TRACH SLOT 9X8X4IN

## (undated) DEVICE — CABL PACE ATRIAL PT BLU

## (undated) DEVICE — CATH DIAG EXPO .056 AL1 6F 100CM

## (undated) DEVICE — SYR LUERLOK 50ML

## (undated) DEVICE — PENCL ROCKRSWCH MEGADYNE W/HOLSTR 10FT SS

## (undated) DEVICE — GW AMPLTZ SUPERSTIFF STR .035IN 180CM

## (undated) DEVICE — SUT SILK 0 SH 30IN K834H

## (undated) DEVICE — ELECTRD DEFIB M/FUNC PROPADZ STRL 2PK

## (undated) DEVICE — DECANTER BAG 9": Brand: MEDLINE INDUSTRIES, INC.

## (undated) DEVICE — BOWL UTIL STRL 32OZ

## (undated) DEVICE — SENSR CERBRL O2 PK/2

## (undated) DEVICE — ADHS SKIN PREMIERPRO EXOFIN TOPICAL HI/VISC .5ML

## (undated) DEVICE — CATH DIAG EXPO M/ PK 6FR FL4/FR4 PIG 3PK

## (undated) DEVICE — KT MANIFOLD CATHLAB CUST

## (undated) DEVICE — PK ATS CUST W CARDIOTOMY RESEVOIR

## (undated) DEVICE — ELECTRD NDL EZ CLN STD 2.75IN

## (undated) DEVICE — PK MINOR SPLT 10

## (undated) DEVICE — 3M™ STERI-DRAPE™ INSTRUMENT POUCH 1018: Brand: STERI-DRAPE™

## (undated) DEVICE — PERCLOSE PROGLIDE™ SUTURE-MEDIATED CLOSURE SYSTEM: Brand: PERCLOSE PROGLIDE™

## (undated) DEVICE — GW CERBRL FC PTFE STD/STR .035 260CM

## (undated) DEVICE — GW PERIPH VASC ADX J/TP SS .035 150CM 3MM

## (undated) DEVICE — GW SAFARI2 PRESH XSM CRV .035IN 3.2X2.9X275CM

## (undated) DEVICE — COVER,MAYO STAND,XL,STERILE: Brand: MEDLINE

## (undated) DEVICE — 3M™ IOBAN™ 2 ANTIMICROBIAL INCISE DRAPE 6651EZ: Brand: IOBAN™ 2

## (undated) DEVICE — DRAPE,TOP,102X53,STERILE: Brand: MEDLINE

## (undated) DEVICE — SLV REPOSTNG CATH STRL 60CM